# Patient Record
Sex: FEMALE | Race: WHITE | Employment: FULL TIME | URBAN - METROPOLITAN AREA
[De-identification: names, ages, dates, MRNs, and addresses within clinical notes are randomized per-mention and may not be internally consistent; named-entity substitution may affect disease eponyms.]

---

## 2018-01-06 PROBLEM — O26.90 PREGNANCY RELATED CONDITION: Status: ACTIVE | Noted: 2018-01-06

## 2018-03-06 PROBLEM — Z32.02 PREGNANCY EXAMINATION OR TEST, NEGATIVE RESULT: Status: ACTIVE | Noted: 2018-03-06

## 2019-11-06 LAB
ABO, EXTERNAL RESULT: NORMAL
HEP B, EXTERNAL RESULT: NEGATIVE
HIV, EXTERNAL RESULT: NON REACTIVE
RH FACTOR, EXTERNAL RESULT: POSITIVE
RPR, EXTERNAL RESULT: NON REACTIVE
RUBELLA TITER, EXTERNAL RESULT: NORMAL

## 2019-11-26 ENCOUNTER — OFFICE VISIT (OUTPATIENT)
Dept: SURGERY | Age: 29
End: 2019-11-26
Payer: COMMERCIAL

## 2019-11-26 VITALS
DIASTOLIC BLOOD PRESSURE: 68 MMHG | HEART RATE: 101 BPM | RESPIRATION RATE: 20 BRPM | WEIGHT: 162.2 LBS | BODY MASS INDEX: 26.18 KG/M2 | SYSTOLIC BLOOD PRESSURE: 117 MMHG

## 2019-11-26 DIAGNOSIS — R22.32 MASS OF LEFT UPPER EXTREMITY: Primary | ICD-10-CM

## 2019-11-26 PROCEDURE — 99203 OFFICE O/P NEW LOW 30 MIN: CPT | Performed by: NURSE PRACTITIONER

## 2019-11-27 ENCOUNTER — HOSPITAL ENCOUNTER (OUTPATIENT)
Dept: ULTRASOUND IMAGING | Age: 29
Discharge: HOME | End: 2019-11-27
Payer: COMMERCIAL

## 2019-11-27 DIAGNOSIS — R22.32 MASS OF LEFT UPPER EXTREMITY: ICD-10-CM

## 2019-11-27 PROCEDURE — 76882 US LMTD JT/FCL EVL NVASC XTR: CPT

## 2019-11-27 PROCEDURE — 76999 ECHO EXAMINATION PROCEDURE: CPT

## 2019-12-03 ENCOUNTER — OFFICE VISIT (OUTPATIENT)
Dept: SURGERY | Age: 29
End: 2019-12-03
Payer: COMMERCIAL

## 2019-12-03 VITALS — OXYGEN SATURATION: 100 % | SYSTOLIC BLOOD PRESSURE: 98 MMHG | DIASTOLIC BLOOD PRESSURE: 70 MMHG | HEART RATE: 115 BPM

## 2019-12-03 DIAGNOSIS — R22.32 MASS OF LEFT UPPER EXTREMITY: Primary | ICD-10-CM

## 2019-12-03 PROCEDURE — 99212 OFFICE O/P EST SF 10 MIN: CPT | Performed by: NURSE PRACTITIONER

## 2020-05-11 NOTE — PROGRESS NOTES
Subjective:     Chief Complaint:    Chief Complaint   Patient presents with    Follow-up     lump/rash left upper arm     HPI:  Chaitanya Champion  presents for follow up of a abscess located over the left arm. Patient states she had a tDap 2 years ago and developed a knot that has not gone away. She states over the last several months it has grown in size and become increasingly more painful. About 4 months, noticed two red dots. Erythema/rash increased in size. Had [address] which revealed:  Likely enlarged lymph node however a complex cyst or small abscess are possible. Past Medical History:   Diagnosis Date    Asthma     Back pain     compressed discs, L4 and L5, herniated disc    Migraines      Family History   Problem Relation Age of Onset    Other Mother         migraines    Other Father         migraines     Review of Systems:  Pertinent items are noted in HPI. Objective:      /74   Pulse 106   Temp 97.6 °F (36.4 °C) (Tympanic)   Resp 18   Wt 199 lb 12.8 oz (90.6 kg)   LMP 09/29/2019   Breastfeeding No   BMI 32.25 kg/m²     Physical Exam  Constitutional:       Appearance: She is well-developed. Eyes:      General: No scleral icterus. Conjunctiva/sclera: Conjunctivae normal.   Cardiovascular:      Rate and Rhythm: Normal rate and regular rhythm. Heart sounds: Normal heart sounds. No murmur. Pulmonary:      Effort: Pulmonary effort is normal. No respiratory distress. Breath sounds: Normal breath sounds. No wheezing. Musculoskeletal:        Arms:        Assessment & Plan: Mass of left upper extremity with new rash - will plan IR to drain abscess and excisional biopsy in office.     Georgie Shea, APRN-CNP

## 2020-05-12 ENCOUNTER — OFFICE VISIT (OUTPATIENT)
Dept: SURGERY | Age: 30
End: 2020-05-12
Payer: COMMERCIAL

## 2020-05-12 VITALS
HEART RATE: 106 BPM | WEIGHT: 199.8 LBS | DIASTOLIC BLOOD PRESSURE: 74 MMHG | SYSTOLIC BLOOD PRESSURE: 120 MMHG | RESPIRATION RATE: 18 BRPM | TEMPERATURE: 97.6 F | BODY MASS INDEX: 32.25 KG/M2

## 2020-05-12 PROCEDURE — 99213 OFFICE O/P EST LOW 20 MIN: CPT | Performed by: NURSE PRACTITIONER

## 2020-05-14 NOTE — PROGRESS NOTES
5/14/20 - . LM with times, surgery on 5/20/20 @0930, arrival 0730 - get your covid-19 test 4 days before your procedure, then go into quarantine till after your procedure.

## 2020-05-15 ENCOUNTER — HOSPITAL ENCOUNTER (OUTPATIENT)
Age: 30
Setting detail: SPECIMEN
Discharge: HOME | End: 2020-05-15
Payer: COMMERCIAL

## 2020-05-15 PROCEDURE — U0002 COVID-19 LAB TEST NON-CDC: HCPCS

## 2020-05-16 LAB
SARS-COV-2: NOT DETECTED
SOURCE: NORMAL

## 2020-05-20 ENCOUNTER — HOSPITAL ENCOUNTER (OUTPATIENT)
Dept: INTERVENTIONAL RADIOLOGY/VASCULAR | Age: 30
Discharge: HOME | End: 2020-05-20
Payer: COMMERCIAL

## 2020-05-20 VITALS
BODY MASS INDEX: 31.98 KG/M2 | SYSTOLIC BLOOD PRESSURE: 112 MMHG | OXYGEN SATURATION: 98 % | HEIGHT: 66 IN | RESPIRATION RATE: 16 BRPM | DIASTOLIC BLOOD PRESSURE: 65 MMHG | TEMPERATURE: 98.3 F | HEART RATE: 104 BPM | WEIGHT: 199 LBS

## 2020-05-20 LAB
APTT: 26 SECONDS (ref 25.1–37.1)
HCT VFR BLD CALC: 43.1 % (ref 37–47)
HEMOGLOBIN: 12.9 GM/DL (ref 12.5–16)
INR BLD: 0.9 INDEX
MCH RBC QN AUTO: 28.4 PG (ref 27–31)
MCHC RBC AUTO-ENTMCNC: 29.9 % (ref 32–36)
MCV RBC AUTO: 94.9 FL (ref 78–100)
PDW BLD-RTO: 12.4 % (ref 11.7–14.9)
PLATELET # BLD: 152 K/CU MM (ref 140–440)
PMV BLD AUTO: 11.1 FL (ref 7.5–11.1)
PROTHROMBIN TIME: 10.9 SECONDS (ref 11.7–14.5)
RBC # BLD: 4.54 M/CU MM (ref 4.2–5.4)
WBC # BLD: 10.2 K/CU MM (ref 4–10.5)

## 2020-05-20 PROCEDURE — 2580000003 HC RX 258: Performed by: RADIOLOGY

## 2020-05-20 PROCEDURE — 76942 ECHO GUIDE FOR BIOPSY: CPT

## 2020-05-20 PROCEDURE — 85730 THROMBOPLASTIN TIME PARTIAL: CPT

## 2020-05-20 PROCEDURE — 85027 COMPLETE CBC AUTOMATED: CPT

## 2020-05-20 PROCEDURE — 85610 PROTHROMBIN TIME: CPT

## 2020-05-20 RX ORDER — SODIUM CHLORIDE 0.9 % (FLUSH) 0.9 %
10 SYRINGE (ML) INJECTION 2 TIMES DAILY
Status: DISCONTINUED | OUTPATIENT
Start: 2020-05-20 | End: 2020-05-21 | Stop reason: HOSPADM

## 2020-05-20 RX ADMIN — SODIUM CHLORIDE, PRESERVATIVE FREE 10 ML: 5 INJECTION INTRAVENOUS at 08:26

## 2020-05-20 ASSESSMENT — PAIN - FUNCTIONAL ASSESSMENT: PAIN_FUNCTIONAL_ASSESSMENT: 0-10

## 2020-05-20 NOTE — H&P
Problems:    * No resolved hospital problems.  *    US evaluation of LUE lesion      PLAN OF CARE/PLANNED PROCEDURE

## 2020-05-21 ENCOUNTER — ANESTHESIA EVENT (OUTPATIENT)
Dept: INTERVENTIONAL RADIOLOGY/VASCULAR | Age: 30
End: 2020-05-21

## 2020-05-21 ENCOUNTER — ANESTHESIA (OUTPATIENT)
Dept: INTERVENTIONAL RADIOLOGY/VASCULAR | Age: 30
End: 2020-05-21

## 2020-05-21 ENCOUNTER — HOSPITAL ENCOUNTER (OUTPATIENT)
Dept: INTERVENTIONAL RADIOLOGY/VASCULAR | Age: 30
Discharge: HOME | End: 2020-05-21
Payer: COMMERCIAL

## 2020-05-21 VITALS
OXYGEN SATURATION: 97 % | SYSTOLIC BLOOD PRESSURE: 125 MMHG | RESPIRATION RATE: 18 BRPM | TEMPERATURE: 97.5 F | HEART RATE: 98 BPM | DIASTOLIC BLOOD PRESSURE: 61 MMHG | WEIGHT: 199 LBS | HEIGHT: 66 IN | BODY MASS INDEX: 31.98 KG/M2

## 2020-05-21 VITALS — OXYGEN SATURATION: 97 % | DIASTOLIC BLOOD PRESSURE: 62 MMHG | SYSTOLIC BLOOD PRESSURE: 117 MMHG

## 2020-05-21 PROCEDURE — 87186 SC STD MICRODIL/AGAR DIL: CPT

## 2020-05-21 PROCEDURE — 7100000010 HC PHASE II RECOVERY - FIRST 15 MIN

## 2020-05-21 PROCEDURE — 88341 IMHCHEM/IMCYTCHM EA ADD ANTB: CPT

## 2020-05-21 PROCEDURE — 3700000001 HC ADD 15 MINUTES (ANESTHESIA)

## 2020-05-21 PROCEDURE — 87073 CULTURE BACTERIA ANAEROBIC: CPT

## 2020-05-21 PROCEDURE — 7100000011 HC PHASE II RECOVERY - ADDTL 15 MIN

## 2020-05-21 PROCEDURE — 3700000000 HC ANESTHESIA ATTENDED CARE

## 2020-05-21 PROCEDURE — 20206 BIOPSY MUSCLE PERQ NEEDLE: CPT

## 2020-05-21 PROCEDURE — 87071 CULTURE AEROBIC QUANT OTHER: CPT

## 2020-05-21 PROCEDURE — 2580000003 HC RX 258: Performed by: NURSE ANESTHETIST, CERTIFIED REGISTERED

## 2020-05-21 PROCEDURE — 76942 ECHO GUIDE FOR BIOPSY: CPT

## 2020-05-21 PROCEDURE — 88173 CYTOPATH EVAL FNA REPORT: CPT

## 2020-05-21 PROCEDURE — 87205 SMEAR GRAM STAIN: CPT

## 2020-05-21 PROCEDURE — 2709999900 HC NON-CHARGEABLE SUPPLY

## 2020-05-21 PROCEDURE — 88305 TISSUE EXAM BY PATHOLOGIST: CPT

## 2020-05-21 PROCEDURE — 88342 IMHCHEM/IMCYTCHM 1ST ANTB: CPT

## 2020-05-21 PROCEDURE — C1894 INTRO/SHEATH, NON-LASER: HCPCS

## 2020-05-21 PROCEDURE — 88333 PATH CONSLTJ SURG CYTO XM 1: CPT

## 2020-05-21 PROCEDURE — 88172 CYTP DX EVAL FNA 1ST EA SITE: CPT

## 2020-05-21 RX ORDER — SODIUM CHLORIDE 9 MG/ML
INJECTION, SOLUTION INTRAVENOUS CONTINUOUS PRN
Status: DISCONTINUED | OUTPATIENT
Start: 2020-05-21 | End: 2020-05-21 | Stop reason: SDUPTHER

## 2020-05-21 RX ORDER — SODIUM CHLORIDE 0.9 % (FLUSH) 0.9 %
10 SYRINGE (ML) INJECTION 2 TIMES DAILY
Status: DISCONTINUED | OUTPATIENT
Start: 2020-05-21 | End: 2020-05-22 | Stop reason: HOSPADM

## 2020-05-21 RX ADMIN — SODIUM CHLORIDE: 9 INJECTION, SOLUTION INTRAVENOUS at 10:04

## 2020-05-21 ASSESSMENT — PAIN SCALES - GENERAL: PAINLEVEL_OUTOF10: 0

## 2020-05-21 ASSESSMENT — PAIN - FUNCTIONAL ASSESSMENT: PAIN_FUNCTIONAL_ASSESSMENT: 0-10

## 2020-05-21 NOTE — PROGRESS NOTES
Returned to 23. Report received from IR nurse. Alert and oriented. Respirations even and unlabored. Color pink. Vital signs obtained. Monitors reapplied. Beverage provided. Band aid on left upper arm dry and intact.

## 2020-05-21 NOTE — ANESTHESIA PRE PROCEDURE
Department of Anesthesiology  Preprocedure Note       Name:  Pauline Centeno   Age:  31 y.o.  :  1990                                          MRN:  [id number]         Date:  2020      Surgeon: * No surgeons listed *    Procedure: * No procedures listed *    Medications prior to admission:   Prior to Admission medications    Medication Sig Start Date End Date Taking? Authorizing Provider   sodium chloride 0.9 % SOLN 30 mL with albuterol (5 MG/ML) 0.5% NEBU Inhale into the lungs daily as needed   Yes Historical Provider, MD   acetaminophen (TYLENOL) 500 MG tablet Take 500 mg by mouth every 6 hours as needed for Pain   Yes Historical Provider, MD   Prenatal MV-Min-Fe Fum-FA-DHA (PRENATAL 1 PO) Take by mouth    Historical Provider, MD       Current medications:    Current Outpatient Medications   Medication Sig Dispense Refill    sodium chloride 0.9 % SOLN 30 mL with albuterol (5 MG/ML) 0.5% NEBU Inhale into the lungs daily as needed      acetaminophen (TYLENOL) 500 MG tablet Take 500 mg by mouth every 6 hours as needed for Pain      Prenatal MV-Min-Fe Fum-FA-DHA (PRENATAL 1 PO) Take by mouth       Current Facility-Administered Medications   Medication Dose Route Frequency Provider Last Rate Last Dose    sodium chloride flush 0.9 % injection 10 mL  10 mL Intravenous BID Gonzales Mora MD           Allergies:     Allergies   Allergen Reactions    Latex Itching    Avocado Anaphylaxis    Banana Anaphylaxis    Phenergan [Promethazine] Other (See Comments)     Sweating, HTN, couldn't speak, restlessness, agitation    Ceclor [Cefaclor] Rash    Cefzil [Cefprozil] Rash    Penicillins Rash    Sulfa Antibiotics Rash       Problem List:    Patient Active Problem List   Diagnosis Code    Pregnancy related condition O26.90    Labor and delivery indication for care or intervention O75.9    Pregnancy examination or test, negative result Z32.02       Past Medical History:        Diagnosis Date    Arthritis back, elbow & wrists    Asthma     Last flare up: 3/15/2020    Back pain     compressed discs, L4 and L5, herniated disc    Migraines     Last migraine: 5/14/20       Past Surgical History:        Procedure Laterality Date    WISDOM TOOTH EXTRACTION  08/2010       Social History:    Social History     Tobacco Use    Smoking status: Never Smoker    Smokeless tobacco: Never Used   Substance Use Topics    Alcohol use: Yes     Comment: rarely - 1 drink a month                                Counseling given: Not Answered      Vital Signs (Current):   Vitals:    05/21/20 0758   BP: 136/77   Pulse: 83   Resp: 18   Temp: 98.2 °F (36.8 °C)   TempSrc: Temporal   SpO2: 96%   Weight: 199 lb (90.3 kg)   Height: 5' 6\" (1.676 m)                                              BP Readings from Last 3 Encounters:   05/21/20 136/77   05/20/20 112/65   05/12/20 120/74       NPO Status: Time of last liquid consumption: 2230                        Time of last solid consumption: 2230                        Date of last liquid consumption: 05/20/20                        Date of last solid food consumption: 05/20/20    BMI:   Wt Readings from Last 3 Encounters:   05/21/20 199 lb (90.3 kg)   05/20/20 199 lb (90.3 kg)   05/12/20 199 lb 12.8 oz (90.6 kg)     Body mass index is 32.12 kg/m². CBC:   Lab Results   Component Value Date    WBC 10.2 05/20/2020    RBC 4.54 05/20/2020    HGB 12.9 05/20/2020    HCT 43.1 05/20/2020    MCV 94.9 05/20/2020    RDW 12.4 05/20/2020     05/20/2020       CMP: No results found for: NA, K, CL, CO2, BUN, CREATININE, GFRAA, AGRATIO, LABGLOM, GLUCOSE, PROT, CALCIUM, BILITOT, ALKPHOS, AST, ALT    POC Tests: No results for input(s): POCGLU, POCNA, POCK, POCCL, POCBUN, POCHEMO, POCHCT in the last 72 hours.     Coags:   Lab Results   Component Value Date    PROTIME 10.9 05/20/2020    INR 0.90 05/20/2020    APTT 26.0 05/20/2020       HCG (If Applicable): No results found for: PREGTESTUR, PREGSERUM, HCG, HCGQUANT     ABGs: No results found for: PHART, PO2ART, MGU4EZK, ZRD7OWD, BEART, F4SFMVOT     Type & Screen (If Applicable):  No results found for: LABABO, LABRH    Drug/Infectious Status (If Applicable):  No results found for: HIV, HEPCAB    COVID-19 Screening (If Applicable):   Lab Results   Component Value Date    COVID19 NOT DETECTED 05/15/2020         Anesthesia Evaluation  Patient summary reviewed no history of anesthetic complications:   Airway: Mallampati: II  TM distance: >3 FB   Neck ROM: full  Mouth opening: > = 3 FB Dental:          Pulmonary: breath sounds clear to auscultation  (+) asthma:                            Cardiovascular:            Rhythm: regular                      Neuro/Psych:   (+) headaches:,             GI/Hepatic/Renal:             Endo/Other:                      ROS comment: Pregnant  Abdominal:           Vascular:                                        Anesthesia Plan      MAC     ASA 1     ( Pre Anesthesia Assessment complete. Chart reviewed on 5/21/2020  )  Induction: intravenous. Anesthetic plan and risks discussed with patient. Plan discussed with CRNA.     Attending anesthesiologist reviewed and agrees with Bebeto Campos MD   5/21/2020

## 2020-05-21 NOTE — PROGRESS NOTES
EFM and TOCO on. VS and OB history obtained. Abdomen soft and nontender to palpation. Denies any vaginal bleeding or leaking of fluid. Fetal movement palpated.

## 2020-05-21 NOTE — ANESTHESIA POSTPROCEDURE EVALUATION
Department of Anesthesiology  Postprocedure Note    Patient: Rudy Luna  MRN: [id number]  [place]: 1990  Date of evaluation: 5/21/2020  Time:  10:33 AM     Procedure Summary     Date:  05/21/20 Room / Location:  [facility]    Anesthesia Start:  1004 Anesthesia Stop:  1033    Procedure:  IR ABSCESS DRAIN PERC Diagnosis:       Abscess of bursa of left upper arm      Arm mass, left      (LUE Abscess Drain)    Scheduled Providers:  Wilber Mckeon Radiologist Responsible Provider:  CAMPBELL Person CRNA    Anesthesia Type:  MAC ASA Status:  1          Anesthesia Type: MAC    Kaci Phase I:      Kaci Phase II:      Last vitals: Reviewed and per EMR flowsheets.        Anesthesia Post Evaluation    Patient location during evaluation: bedside  Patient participation: complete - patient participated  Level of consciousness: awake and alert  Nausea & Vomiting: no vomiting and no nausea  Complications: no  Cardiovascular status: blood pressure returned to baseline  Respiratory status: acceptable  Hydration status: euvolemic  Comments:  prior to disposition home

## 2020-05-21 NOTE — ANESTHESIA PRE PROCEDURE
Migraines     Last migraine: 5/14/20       Past Surgical History:        Procedure Laterality Date    WISDOM TOOTH EXTRACTION  08/2010       Social History:    Social History     Tobacco Use    Smoking status: Never Smoker    Smokeless tobacco: Never Used   Substance Use Topics    Alcohol use: Yes     Comment: rarely - 1 drink a month                                Counseling given: Not Answered      Vital Signs (Current): There were no vitals filed for this visit. BP Readings from Last 3 Encounters:   05/20/20 112/65   05/12/20 120/74   12/03/19 98/70       NPO Status:                                                                                 BMI:   Wt Readings from Last 3 Encounters:   05/20/20 199 lb (90.3 kg)   05/12/20 199 lb 12.8 oz (90.6 kg)   11/26/19 162 lb 3.2 oz (73.6 kg)     There is no height or weight on file to calculate BMI.    CBC:   Lab Results   Component Value Date    WBC 10.2 05/20/2020    RBC 4.54 05/20/2020    HGB 12.9 05/20/2020    HCT 43.1 05/20/2020    MCV 94.9 05/20/2020    RDW 12.4 05/20/2020     05/20/2020       CMP: No results found for: NA, K, CL, CO2, BUN, CREATININE, GFRAA, AGRATIO, LABGLOM, GLUCOSE, PROT, CALCIUM, BILITOT, ALKPHOS, AST, ALT    POC Tests: No results for input(s): POCGLU, POCNA, POCK, POCCL, POCBUN, POCHEMO, POCHCT in the last 72 hours.     Coags:   Lab Results   Component Value Date    PROTIME 10.9 05/20/2020    INR 0.90 05/20/2020    APTT 26.0 05/20/2020       HCG (If Applicable): No results found for: PREGTESTUR, PREGSERUM, HCG, HCGQUANT     ABGs: No results found for: PHART, PO2ART, DGX5LLC, HSJ3XKB, BEART, A1KIVXOF     Type & Screen (If Applicable):  No results found for: LABABO, LABRH    Drug/Infectious Status (If Applicable):  No results found for: HIV, HEPCAB    COVID-19 Screening (If Applicable):   Lab Results   Component Value Date    COVID19 NOT DETECTED 05/15/2020         Anesthesia

## 2020-05-21 NOTE — H&P
Date:5/21/2020  Name:Nancy Rodriguez   [facility]:[birth year]   #:[id number]    SEX:female   Referring Physician:    Chief Complaint: LUE mass  History of Present Illness:   Patient is a 32 YO F who is 33 weeks pregnant and presents for an US guided biopsy/aspiraton of a LUE mass. Anesthesia will be present for the case should the patient need sedation.     HISTORY AND PHYSICAL  Abscess of bursa of left upper arm [M71.022]    Past Medical History:  Past Medical History:   Diagnosis Date    Arthritis     back, elbow & wrists    Asthma     Last flare up: 3/15/2020    Back pain     compressed discs, L4 and L5, herniated disc    Migraines     Last migraine: 5/14/20       Past Surgical History:  Past Surgical History:   Procedure Laterality Date    WISDOM TOOTH EXTRACTION  08/2010       Social History:  Social History     Socioeconomic History    Marital status:      Spouse name: Not on file    Number of children: Not on file    Years of education: Not on file    Highest education level: Not on file   Occupational History    Not on file   Social Needs    Financial resource strain: Not on file    Food insecurity     Worry: Not on file     Inability: Not on file    Transportation needs     Medical: Not on file     Non-medical: Not on file   Tobacco Use    Smoking status: Never Smoker    Smokeless tobacco: Never Used   Substance and Sexual Activity    Alcohol use: Yes     Comment: rarely - 1 drink a month    Drug use: No    Sexual activity: Yes     Partners: Male   Lifestyle    Physical activity     Days per week: Not on file     Minutes per session: Not on file    Stress: Not on file   Relationships    Social connections     Talks on phone: Not on file     Gets together: Not on file     Attends [employer] service: Not on file     Active member of club or organization: Not on file     Attends meetings of clubs or organizations: Not on file     Relationship status: Not on file    Intimate partner

## 2020-05-25 LAB
CULTURE: ABNORMAL
CULTURE: ABNORMAL
Lab: ABNORMAL
SPECIMEN: ABNORMAL

## 2020-05-29 ENCOUNTER — OFFICE VISIT (OUTPATIENT)
Dept: SURGERY | Age: 30
End: 2020-05-29
Payer: COMMERCIAL

## 2020-05-29 VITALS
HEART RATE: 110 BPM | SYSTOLIC BLOOD PRESSURE: 122 MMHG | DIASTOLIC BLOOD PRESSURE: 75 MMHG | RESPIRATION RATE: 18 BRPM | TEMPERATURE: 97.5 F

## 2020-05-29 PROCEDURE — 99212 OFFICE O/P EST SF 10 MIN: CPT | Performed by: NURSE PRACTITIONER

## 2020-05-29 NOTE — PROGRESS NOTES
Subjective:     Chief Complaint:    Chief Complaint   Patient presents with    Follow-up     discuss Bx path results     HPI:  Rubin Booker  presents for follow up of a abscess located over the left arm. Patient states she had a tDap 2 years ago and developed a knot that has not gone away. She states over the last several months it has grown in size and become increasingly more painful. About 4 months, noticed two red dots. Erythema/rash increased in size. Had [address] which revealed:  Likely enlarged lymph node however a complex cyst or small abscess are possible. Pathology revealed:   Histologic sections demonstrate a small biopsy of a  proliferation of bland spindle cells admixed with a chronic  inflammatory infiltrate with mixed histiocytes and  hemosiderin depositions    Past Medical History:   Diagnosis Date    Arthritis     back, elbow & wrists    Asthma     Last flare up: 3/15/2020    Back pain     compressed discs, L4 and L5, herniated disc    Migraines     Last migraine: 5/14/20     Family History   Problem Relation Age of Onset    Other Mother         migraines    Other Father         migraines     Review of Systems:  Pertinent items are noted in HPI. Objective:      /75   Pulse 110   Temp 97.5 °F (36.4 °C) (Tympanic)   Resp 18   LMP 09/29/2019   Breastfeeding No     Physical Exam  Constitutional:       Appearance: She is well-developed. Eyes:      General: No scleral icterus. Conjunctiva/sclera: Conjunctivae normal.   Cardiovascular:      Rate and Rhythm: Normal rate and regular rhythm. Heart sounds: Normal heart sounds. No murmur. Pulmonary:      Effort: Pulmonary effort is normal. No respiratory distress. Breath sounds: Normal breath sounds. No wheezing. Musculoskeletal:        Arms:        Assessment & Plan: Mass of left upper extremity with new rash - will refer to dermatology for eval/treat.     Casi Lopez, APRN-CNP

## 2020-06-10 LAB — GBS, EXTERNAL RESULT: NEGATIVE

## 2020-06-23 ENCOUNTER — HOSPITAL ENCOUNTER (OUTPATIENT)
Age: 30
Discharge: HOME | End: 2020-06-23
Payer: COMMERCIAL

## 2020-06-23 PROCEDURE — U0002 COVID-19 LAB TEST NON-CDC: HCPCS

## 2020-06-24 LAB
SARS-COV-2: NOT DETECTED
SOURCE: NORMAL

## 2020-07-07 ENCOUNTER — ANESTHESIA (OUTPATIENT)
Dept: LABOR AND DELIVERY | Age: 30
End: 2020-07-07
Payer: COMMERCIAL

## 2020-07-07 ENCOUNTER — HOSPITAL ENCOUNTER (INPATIENT)
Age: 30
LOS: 2 days | Discharge: HOME | End: 2020-07-09
Attending: OBSTETRICS & GYNECOLOGY | Admitting: OBSTETRICS & GYNECOLOGY
Payer: COMMERCIAL

## 2020-07-07 ENCOUNTER — ANESTHESIA EVENT (OUTPATIENT)
Dept: LABOR AND DELIVERY | Age: 30
End: 2020-07-07
Payer: COMMERCIAL

## 2020-07-07 PROBLEM — O26.93 PREGNANCY RELATED CONDITION IN THIRD TRIMESTER: Status: ACTIVE | Noted: 2020-07-07

## 2020-07-07 LAB
ABO/RH: NORMAL
ANTIBODY SCREEN: NEGATIVE
BACTERIA: NEGATIVE /HPF
BILIRUBIN URINE: NEGATIVE MG/DL
BLOOD, URINE: NEGATIVE
CLARITY: CLEAR
COLOR: COLORLESS
GLUCOSE, URINE: NEGATIVE MG/DL
HCT VFR BLD CALC: 36.7 % (ref 37–47)
HEMOGLOBIN: 11.8 GM/DL (ref 12.5–16)
KETONES, URINE: NEGATIVE MG/DL
LEUKOCYTE ESTERASE, URINE: NEGATIVE
MCH RBC QN AUTO: 27.1 PG (ref 27–31)
MCHC RBC AUTO-ENTMCNC: 32.2 % (ref 32–36)
MCV RBC AUTO: 84.2 FL (ref 78–100)
MUCUS: ABNORMAL HPF
NITRITE URINE, QUANTITATIVE: NEGATIVE
PDW BLD-RTO: 13.3 % (ref 11.7–14.9)
PH, URINE: 6 (ref 5–8)
PLATELET # BLD: 161 K/CU MM (ref 140–440)
PMV BLD AUTO: 11.4 FL (ref 7.5–11.1)
PROTEIN UA: NEGATIVE MG/DL
RBC # BLD: 4.36 M/CU MM (ref 4.2–5.4)
RBC URINE: <1 /HPF (ref 0–6)
SPECIFIC GRAVITY UA: 1 (ref 1–1.03)
SQUAMOUS EPITHELIAL: <1 /HPF
TRICHOMONAS: ABNORMAL /HPF
UROBILINOGEN, URINE: NORMAL MG/DL (ref 0.2–1)
WBC # BLD: 10.8 K/CU MM (ref 4–10.5)
WBC UA: <1 /HPF (ref 0–5)

## 2020-07-07 PROCEDURE — 2500000003 HC RX 250 WO HCPCS: Performed by: NURSE ANESTHETIST, CERTIFIED REGISTERED

## 2020-07-07 PROCEDURE — 86850 RBC ANTIBODY SCREEN: CPT

## 2020-07-07 PROCEDURE — 6370000000 HC RX 637 (ALT 250 FOR IP): Performed by: OBSTETRICS & GYNECOLOGY

## 2020-07-07 PROCEDURE — 80307 DRUG TEST PRSMV CHEM ANLYZR: CPT

## 2020-07-07 PROCEDURE — 2580000003 HC RX 258: Performed by: OBSTETRICS & GYNECOLOGY

## 2020-07-07 PROCEDURE — 86901 BLOOD TYPING SEROLOGIC RH(D): CPT

## 2020-07-07 PROCEDURE — 1220000000 HC SEMI PRIVATE OB R&B

## 2020-07-07 PROCEDURE — 81001 URINALYSIS AUTO W/SCOPE: CPT

## 2020-07-07 PROCEDURE — 86900 BLOOD TYPING SEROLOGIC ABO: CPT

## 2020-07-07 PROCEDURE — 85027 COMPLETE CBC AUTOMATED: CPT

## 2020-07-07 RX ORDER — SODIUM CHLORIDE, SODIUM LACTATE, POTASSIUM CHLORIDE, CALCIUM CHLORIDE 600; 310; 30; 20 MG/100ML; MG/100ML; MG/100ML; MG/100ML
INJECTION, SOLUTION INTRAVENOUS CONTINUOUS
Status: DISCONTINUED | OUTPATIENT
Start: 2020-07-07 | End: 2020-07-09 | Stop reason: HOSPADM

## 2020-07-07 RX ORDER — ROPIVACAINE HYDROCHLORIDE 2 MG/ML
12 INJECTION, SOLUTION EPIDURAL; INFILTRATION; PERINEURAL CONTINUOUS
Status: DISCONTINUED | OUTPATIENT
Start: 2020-07-08 | End: 2020-07-09 | Stop reason: HOSPADM

## 2020-07-07 RX ORDER — LIDOCAINE HYDROCHLORIDE 10 MG/ML
30 INJECTION, SOLUTION EPIDURAL; INFILTRATION; INTRACAUDAL; PERINEURAL PRN
Status: DISCONTINUED | OUTPATIENT
Start: 2020-07-07 | End: 2020-07-09 | Stop reason: HOSPADM

## 2020-07-07 RX ORDER — ONDANSETRON 2 MG/ML
4 INJECTION INTRAMUSCULAR; INTRAVENOUS EVERY 6 HOURS PRN
Status: DISCONTINUED | OUTPATIENT
Start: 2020-07-07 | End: 2020-07-08 | Stop reason: HOSPADM

## 2020-07-07 RX ORDER — FENTANYL CITRATE 50 UG/ML
100 INJECTION, SOLUTION INTRAMUSCULAR; INTRAVENOUS
Status: DISCONTINUED | OUTPATIENT
Start: 2020-07-07 | End: 2020-07-08 | Stop reason: HOSPADM

## 2020-07-07 RX ORDER — NALOXONE HYDROCHLORIDE 0.4 MG/ML
0.4 INJECTION, SOLUTION INTRAMUSCULAR; INTRAVENOUS; SUBCUTANEOUS PRN
Status: DISCONTINUED | OUTPATIENT
Start: 2020-07-07 | End: 2020-07-09 | Stop reason: HOSPADM

## 2020-07-07 RX ADMIN — LIDOCAINE HYDROCHLORIDE 3 ML: 10 INJECTION, SOLUTION EPIDURAL; INFILTRATION; INTRACAUDAL; PERINEURAL at 23:58

## 2020-07-07 RX ADMIN — SODIUM CHLORIDE, POTASSIUM CHLORIDE, SODIUM LACTATE AND CALCIUM CHLORIDE: 600; 310; 30; 20 INJECTION, SOLUTION INTRAVENOUS at 21:05

## 2020-07-07 RX ADMIN — Medication 25 MCG: at 21:32

## 2020-07-07 ASSESSMENT — PAIN SCALES - GENERAL
PAINLEVEL_OUTOF10: 0
PAINLEVEL_OUTOF10: 0

## 2020-07-07 ASSESSMENT — PAIN DESCRIPTION - DESCRIPTORS
DESCRIPTORS: CRAMPING;ACHING
DESCRIPTORS: CRAMPING;ACHING

## 2020-07-08 LAB
AMPHETAMINES: NEGATIVE
BARBITURATE SCREEN URINE: NEGATIVE
BENZODIAZEPINE SCREEN, URINE: NEGATIVE
CANNABINOID SCREEN URINE: NEGATIVE
COCAINE METABOLITE: NEGATIVE
OPIATES, URINE: NEGATIVE
OXYCODONE: NEGATIVE
PHENCYCLIDINE, URINE: NEGATIVE

## 2020-07-08 PROCEDURE — 3E0P7VZ INTRODUCTION OF HORMONE INTO FEMALE REPRODUCTIVE, VIA NATURAL OR ARTIFICIAL OPENING: ICD-10-PCS | Performed by: OBSTETRICS & GYNECOLOGY

## 2020-07-08 PROCEDURE — 0KQM0ZZ REPAIR PERINEUM MUSCLE, OPEN APPROACH: ICD-10-PCS | Performed by: OBSTETRICS & GYNECOLOGY

## 2020-07-08 PROCEDURE — 6370000000 HC RX 637 (ALT 250 FOR IP): Performed by: OBSTETRICS & GYNECOLOGY

## 2020-07-08 PROCEDURE — 1220000000 HC SEMI PRIVATE OB R&B

## 2020-07-08 PROCEDURE — 51702 INSERT TEMP BLADDER CATH: CPT

## 2020-07-08 PROCEDURE — 6360000002 HC RX W HCPCS: Performed by: NURSE ANESTHETIST, CERTIFIED REGISTERED

## 2020-07-08 PROCEDURE — 2500000003 HC RX 250 WO HCPCS: Performed by: NURSE ANESTHETIST, CERTIFIED REGISTERED

## 2020-07-08 PROCEDURE — 6360000002 HC RX W HCPCS: Performed by: OBSTETRICS & GYNECOLOGY

## 2020-07-08 PROCEDURE — 7200000001 HC VAGINAL DELIVERY

## 2020-07-08 PROCEDURE — 3700000025 EPIDURAL BLOCK: Performed by: NURSE ANESTHETIST, CERTIFIED REGISTERED

## 2020-07-08 PROCEDURE — 2580000003 HC RX 258: Performed by: OBSTETRICS & GYNECOLOGY

## 2020-07-08 RX ORDER — FERROUS SULFATE 325(65) MG
325 TABLET ORAL 2 TIMES DAILY WITH MEALS
Status: DISCONTINUED | OUTPATIENT
Start: 2020-07-08 | End: 2020-07-09 | Stop reason: HOSPADM

## 2020-07-08 RX ORDER — ACETAMINOPHEN 325 MG/1
650 TABLET ORAL EVERY 4 HOURS PRN
Status: DISCONTINUED | OUTPATIENT
Start: 2020-07-08 | End: 2020-07-08

## 2020-07-08 RX ORDER — HYDROCODONE BITARTRATE AND ACETAMINOPHEN 5; 325 MG/1; MG/1
1 TABLET ORAL EVERY 4 HOURS PRN
Status: DISCONTINUED | OUTPATIENT
Start: 2020-07-08 | End: 2020-07-09 | Stop reason: HOSPADM

## 2020-07-08 RX ORDER — DOCUSATE SODIUM 100 MG/1
CAPSULE, LIQUID FILLED ORAL
Status: DISPENSED
Start: 2020-07-08 | End: 2020-07-08

## 2020-07-08 RX ORDER — LIDOCAINE HYDROCHLORIDE AND EPINEPHRINE 15; 5 MG/ML; UG/ML
INJECTION, SOLUTION EPIDURAL PRN
Status: DISCONTINUED | OUTPATIENT
Start: 2020-07-08 | End: 2020-07-08 | Stop reason: SDUPTHER

## 2020-07-08 RX ORDER — DOCUSATE SODIUM 100 MG/1
100 CAPSULE, LIQUID FILLED ORAL 2 TIMES DAILY
Status: DISCONTINUED | OUTPATIENT
Start: 2020-07-08 | End: 2020-07-09 | Stop reason: HOSPADM

## 2020-07-08 RX ORDER — ROPIVACAINE HYDROCHLORIDE 2 MG/ML
INJECTION, SOLUTION EPIDURAL; INFILTRATION; PERINEURAL PRN
Status: DISCONTINUED | OUTPATIENT
Start: 2020-07-08 | End: 2020-07-08 | Stop reason: SDUPTHER

## 2020-07-08 RX ORDER — ONDANSETRON 2 MG/ML
4 INJECTION INTRAMUSCULAR; INTRAVENOUS EVERY 6 HOURS PRN
Status: DISCONTINUED | OUTPATIENT
Start: 2020-07-08 | End: 2020-07-09 | Stop reason: HOSPADM

## 2020-07-08 RX ORDER — ACETAMINOPHEN 325 MG/1
650 TABLET ORAL EVERY 4 HOURS PRN
Status: DISCONTINUED | OUTPATIENT
Start: 2020-07-08 | End: 2020-07-09 | Stop reason: HOSPADM

## 2020-07-08 RX ORDER — SODIUM CHLORIDE, SODIUM LACTATE, POTASSIUM CHLORIDE, CALCIUM CHLORIDE 600; 310; 30; 20 MG/100ML; MG/100ML; MG/100ML; MG/100ML
INJECTION, SOLUTION INTRAVENOUS CONTINUOUS
Status: DISCONTINUED | OUTPATIENT
Start: 2020-07-08 | End: 2020-07-09 | Stop reason: HOSPADM

## 2020-07-08 RX ORDER — SODIUM CHLORIDE 0.9 % (FLUSH) 0.9 %
10 SYRINGE (ML) INJECTION PRN
Status: DISCONTINUED | OUTPATIENT
Start: 2020-07-08 | End: 2020-07-09 | Stop reason: HOSPADM

## 2020-07-08 RX ORDER — LANOLIN 100 %
OINTMENT (GRAM) TOPICAL PRN
Status: DISCONTINUED | OUTPATIENT
Start: 2020-07-08 | End: 2020-07-09 | Stop reason: HOSPADM

## 2020-07-08 RX ORDER — ONDANSETRON 4 MG/1
8 TABLET, ORALLY DISINTEGRATING ORAL EVERY 8 HOURS PRN
Status: DISCONTINUED | OUTPATIENT
Start: 2020-07-08 | End: 2020-07-09 | Stop reason: HOSPADM

## 2020-07-08 RX ORDER — ROPIVACAINE HYDROCHLORIDE 2 MG/ML
INJECTION, SOLUTION EPIDURAL; INFILTRATION; PERINEURAL CONTINUOUS PRN
Status: DISCONTINUED | OUTPATIENT
Start: 2020-07-08 | End: 2020-07-08 | Stop reason: SDUPTHER

## 2020-07-08 RX ORDER — LIDOCAINE HYDROCHLORIDE 10 MG/ML
INJECTION, SOLUTION EPIDURAL; INFILTRATION; INTRACAUDAL; PERINEURAL PRN
Status: DISCONTINUED | OUTPATIENT
Start: 2020-07-07 | End: 2020-07-08 | Stop reason: SDUPTHER

## 2020-07-08 RX ORDER — SODIUM CHLORIDE 0.9 % (FLUSH) 0.9 %
10 SYRINGE (ML) INJECTION EVERY 12 HOURS SCHEDULED
Status: DISCONTINUED | OUTPATIENT
Start: 2020-07-08 | End: 2020-07-09 | Stop reason: HOSPADM

## 2020-07-08 RX ORDER — HYDROCODONE BITARTRATE AND ACETAMINOPHEN 5; 325 MG/1; MG/1
2 TABLET ORAL EVERY 4 HOURS PRN
Status: DISCONTINUED | OUTPATIENT
Start: 2020-07-08 | End: 2020-07-09 | Stop reason: HOSPADM

## 2020-07-08 RX ORDER — IBUPROFEN 800 MG/1
800 TABLET ORAL EVERY 8 HOURS
Status: DISCONTINUED | OUTPATIENT
Start: 2020-07-08 | End: 2020-07-09 | Stop reason: HOSPADM

## 2020-07-08 RX ADMIN — IBUPROFEN 800 MG: 800 TABLET, FILM COATED ORAL at 13:05

## 2020-07-08 RX ADMIN — ONDANSETRON 4 MG: 2 INJECTION INTRAMUSCULAR; INTRAVENOUS at 02:28

## 2020-07-08 RX ADMIN — SODIUM CHLORIDE, POTASSIUM CHLORIDE, SODIUM LACTATE AND CALCIUM CHLORIDE: 600; 310; 30; 20 INJECTION, SOLUTION INTRAVENOUS at 00:11

## 2020-07-08 RX ADMIN — LIDOCAINE HYDROCHLORIDE,EPINEPHRINE BITARTRATE 5 ML: 15; .005 INJECTION, SOLUTION EPIDURAL; INFILTRATION; INTRACAUDAL; PERINEURAL at 00:09

## 2020-07-08 RX ADMIN — ACETAMINOPHEN 650 MG: 325 TABLET ORAL at 08:19

## 2020-07-08 RX ADMIN — Medication 500 MILLI-UNITS/MIN: at 03:53

## 2020-07-08 RX ADMIN — HYDROCODONE BITARTRATE AND ACETAMINOPHEN 1 TABLET: 5; 325 TABLET ORAL at 14:40

## 2020-07-08 RX ADMIN — MAGNESIUM HYDROXIDE 30 ML: 400 SUSPENSION ORAL at 08:18

## 2020-07-08 RX ADMIN — IBUPROFEN 800 MG: 800 TABLET, FILM COATED ORAL at 21:10

## 2020-07-08 RX ADMIN — ROPIVACAINE HYDROCHLORIDE 5 ML: 2 INJECTION, SOLUTION EPIDURAL; INFILTRATION at 00:16

## 2020-07-08 RX ADMIN — IBUPROFEN 800 MG: 800 TABLET, FILM COATED ORAL at 05:10

## 2020-07-08 RX ADMIN — ROPIVACAINE HYDROCHLORIDE 5 ML: 2 INJECTION, SOLUTION EPIDURAL; INFILTRATION at 00:11

## 2020-07-08 RX ADMIN — Medication 999 MILLI-UNITS/MIN: at 03:09

## 2020-07-08 RX ADMIN — DOCUSATE SODIUM 100 MG: 100 CAPSULE, LIQUID FILLED ORAL at 08:20

## 2020-07-08 RX ADMIN — ROPIVACAINE HYDROCHLORIDE 12 ML/HR: 2 INJECTION, SOLUTION EPIDURAL; INFILTRATION at 00:18

## 2020-07-08 RX ADMIN — DOCUSATE SODIUM 100 MG: 100 CAPSULE, LIQUID FILLED ORAL at 21:10

## 2020-07-08 ASSESSMENT — PAIN DESCRIPTION - FREQUENCY: FREQUENCY: INTERMITTENT

## 2020-07-08 ASSESSMENT — PAIN SCALES - GENERAL
PAINLEVEL_OUTOF10: 3
PAINLEVEL_OUTOF10: 2
PAINLEVEL_OUTOF10: 0
PAINLEVEL_OUTOF10: 4
PAINLEVEL_OUTOF10: 0
PAINLEVEL_OUTOF10: 0
PAINLEVEL_OUTOF10: 6
PAINLEVEL_OUTOF10: 3
PAINLEVEL_OUTOF10: 3
PAINLEVEL_OUTOF10: 0

## 2020-07-08 ASSESSMENT — PAIN DESCRIPTION - PROGRESSION
CLINICAL_PROGRESSION: NOT CHANGED
CLINICAL_PROGRESSION: GRADUALLY WORSENING
CLINICAL_PROGRESSION: GRADUALLY IMPROVING

## 2020-07-08 ASSESSMENT — PAIN DESCRIPTION - ONSET: ONSET: GRADUAL

## 2020-07-08 ASSESSMENT — PAIN DESCRIPTION - DESCRIPTORS
DESCRIPTORS: CRAMPING
DESCRIPTORS: CRAMPING;ACHING
DESCRIPTORS: ACHING;CRAMPING

## 2020-07-08 ASSESSMENT — PAIN DESCRIPTION - LOCATION: LOCATION: ABDOMEN

## 2020-07-08 ASSESSMENT — PAIN DESCRIPTION - ORIENTATION: ORIENTATION: LOWER

## 2020-07-08 ASSESSMENT — PAIN DESCRIPTION - PAIN TYPE: TYPE: ACUTE PAIN

## 2020-07-08 ASSESSMENT — PAIN - FUNCTIONAL ASSESSMENT: PAIN_FUNCTIONAL_ASSESSMENT: ACTIVITIES ARE NOT PREVENTED

## 2020-07-08 NOTE — H&P
Department of Obstetrics and Gynecology   Obstetrics History and Physical        CHIEF COMPLAINT:   Chief Complaint   Patient presents with    Scheduled Induction         HISTORY OF PRESENT ILLNESS:      The patient is a 31 y.o. female at 44w3d. OB History        3    Para   2    Term   2            AB        Living   2       SAB        TAB        Ectopic        Molar        Multiple        Live Births   2            Patient presents with a chief complaint as above and is being admitted for induction    Estimated Due Date: Estimated Date of Delivery: 20    PRENATAL CARE:    Complicated by: none    PAST OB HISTORY  OB History        3    Para   2    Term   2            AB        Living   2       SAB        TAB        Ectopic        Molar        Multiple        Live Births   2                Past Medical History:        Diagnosis Date    Arthritis     back, elbow & wrists    Asthma     Last flare up: 3/15/2020    Back pain     compressed discs, L4 and L5, herniated disc    Migraines     Last migraine: 20     Past Surgical History:        Procedure Laterality Date    WISDOM TOOTH EXTRACTION  2010     Allergies:  Latex; Avocado; Banana; Phenergan [promethazine]; Ceclor [cefaclor]; Cefzil [cefprozil];  Penicillins; and Sulfa antibiotics  Social History:    Social History     Socioeconomic History    Marital status:      Spouse name: Not on file    Number of children: Not on file    Years of education: Not on file    Highest education level: Not on file   Occupational History    Not on file   Social Needs    Financial resource strain: Not on file    Food insecurity     Worry: Not on file     Inability: Not on file    Transportation needs     Medical: Not on file     Non-medical: Not on file   Tobacco Use    Smoking status: Never Smoker    Smokeless tobacco: Never Used   Substance and Sexual Activity    Alcohol use: Yes     Comment: rarely - 1 drink a month  Drug use: No    Sexual activity: Yes     Partners: Male   Lifestyle    Physical activity     Days per week: Not on file     Minutes per session: Not on file    Stress: Not on file   Relationships    Social connections     Talks on phone: Not on file     Gets together: Not on file     Attends Voodoo service: Not on file     Active member of club or organization: Not on file     Attends meetings of clubs or organizations: Not on file     Relationship status: Not on file    Intimate partner violence     Fear of current or ex partner: Not on file     Emotionally abused: Not on file     Physically abused: Not on file     Forced sexual activity: Not on file   Other Topics Concern    Not on file   Social History Narrative    Not on file     Family History:       Problem Relation Age of Onset    Other Mother         migraines    Other Father         migraines     Medications Prior to Admission:  Medications Prior to Admission: ALBUTEROL IN, Inhale into the lungs  Prenatal MV-Min-Fe Fum-FA-DHA (PRENATAL 1 PO), Take by mouth  acetaminophen (TYLENOL) 500 MG tablet, Take 500 mg by mouth every 6 hours as needed for Pain  [DISCONTINUED] sodium chloride 0.9 % SOLN 30 mL with albuterol (5 MG/ML) 0.5% NEBU, Inhale into the lungs daily as needed    REVIEW OF SYSTEMS:    CONSTITUTIONAL:  negative  RESPIRATORY:  negative  CARDIOVASCULAR:  negative  GASTROINTESTINAL:  negative  ALLERGIC/IMMUNOLOGIC:  negative  NEUROLOGICAL:  negative  BEHAVIOR/PSYCH:  negative    PHYSICAL EXAM:  Blood pressure (!) 106/57, pulse 93, temperature 97.2 °F (36.2 °C), temperature source Axillary, resp. rate 16, height 5' 6\" (1.676 m), weight 213 lb (96.6 kg), last menstrual period 09/29/2019, not currently breastfeeding. General appearance:  awake, alert, cooperative, no apparent distress, and appears stated age  Neurologic:  Awake, alert, oriented to name, place and time.     Lungs:  No increased work of breathing, good air exchange  Abdomen: Soft, non tender, gravid, consistent with her gestational age,   Fetal heart rate:    Baseline Heart Rate:  130s        Accelerations:  present       Long Term Variability:  moderate       Decelerations:  absent       Pelvis:  Adequate pelvis  Cervix: 2 cm 75% soft -2      Contraction frequency:  0 minutes    Membranes:  Intact    ASSESSMENT AND PLAN:    Labor: Admit, anticipate normal delivery, routine labor orders  Fetus: Reassuring  GBS:negative  Other: IV hydration and antiemetics, plan cytotec for cervical ripening and labor induction

## 2020-07-08 NOTE — ANESTHESIA PRE PROCEDURE
Department of Anesthesiology  Preprocedure Note       Name:  Marimar De Santiago   Age:  31 y.o.  :  1990                                          MRN:  [id number]         Date:  2020      Surgeon: * No surgeons listed *    Procedure: * No procedures listed *    Medications prior to admission:   Prior to Admission medications    Medication Sig Start Date End Date Taking?  Authorizing Provider   ALBUTEROL IN Inhale into the lungs   Yes Historical Provider, MD   Prenatal MV-Min-Fe Fum-FA-DHA (PRENATAL 1 PO) Take by mouth   Yes Historical Provider, MD   acetaminophen (TYLENOL) 500 MG tablet Take 500 mg by mouth every 6 hours as needed for Pain   Yes Historical Provider, MD       Current medications:    Current Facility-Administered Medications   Medication Dose Route Frequency Provider Last Rate Last Dose    lactated ringers infusion   Intravenous Continuous Symone Zhong  mL/hr at 20 0043      lidocaine PF 1 % injection 30 mL  30 mL Other PRN Symone Zhong MD        fentaNYL (SUBLIMAZE) injection 100 mcg  100 mcg Intravenous Q1H PRN Symone Zhong MD        ondansetron [facility]) injection 4 mg  4 mg Intravenous Q6H PRN Symone Zhong MD        famotidine (PEPCID) injection 20 mg  20 mg Intravenous BID PRN Symone Zhong MD        oxytocin (PITOCIN) 30 units in 500 mL infusion  1 ophelia-units/min Intravenous Continuous PRN Symone Zhong MD        misoprostol (CYTOTEC) pre-split tablet TABS 25 mcg  25 mcg Vaginal Q4H Symone Zhong MD   25 mcg at 20 2132    naloxone [facility]) injection 0.4 mg  0.4 mg Intravenous PRN Christiana Puckett MD        ropivacaine (NAROPIN) 0.2% injection 0.2%  12 mL/hr Epidural Continuous Christiana Puckett MD         Facility-Administered Medications Ordered in Other Encounters   Medication Dose Route Frequency Provider Last Rate Last Dose    lidocaine PF 1 % injection    PRN Dandy Nolan APRN - CRNA   3 mL at 07/07/20 2358    Lidocaine-EPINEPHrine 1.5 %-1:023470    PRN Clayton Aspen, APRN - CRNA   5 mL at 07/08/20 0009    ropivacaine (NAROPIN) 0.2% injection 0.2%    PRN Clayton Aspen, APRN - CRNA   5 mL at 07/08/20 0016    ropivacaine (NAROPIN) 0.2% injection 0.2%    Continuous PRN Clayton Aspen, APRN - CRNA 12 mL/hr at 07/08/20 0018 12 mL/hr at 07/08/20 0018       Allergies:     Allergies   Allergen Reactions    Latex Itching    Avocado Anaphylaxis    Banana Anaphylaxis    Phenergan [Promethazine] Other (See Comments)     Sweating, HTN, couldn't speak, restlessness, agitation    Ceclor [Cefaclor] Rash    Cefzil [Cefprozil] Rash    Penicillins Rash    Sulfa Antibiotics Rash       Problem List:    Patient Active Problem List   Diagnosis Code    Pregnancy related condition O26.90    Labor and delivery indication for care or intervention O75.9    Pregnancy examination or test, negative result Z32.02    Pregnancy related condition in third trimester O26.93       Past Medical History:        Diagnosis Date    Arthritis     back, elbow & wrists    Asthma     Last flare up: 3/15/2020    Back pain     compressed discs, L4 and L5, herniated disc    Migraines     Last migraine: 5/14/20       Past Surgical History:        Procedure Laterality Date    WISDOM TOOTH EXTRACTION  08/2010       Social History:    Social History     Tobacco Use    Smoking status: Never Smoker    Smokeless tobacco: Never Used   Substance Use Topics    Alcohol use: Yes     Comment: rarely - 1 drink a month                                Counseling given: Not Answered      Vital Signs (Current):   Vitals:    07/08/20 0020 07/08/20 0023 07/08/20 0028 07/08/20 0033   BP: (!) 102/57 (!) 99/55 (!) 98/55 (!) 104/58   Pulse: 105 97 98 102   Resp:  20     Temp:  37.3 °C (99.2 °F)     TempSrc:  Axillary     Weight:       Height:                                                  BP Readings from Last 3 Encounters:   07/08/20 (!) 104/58 05/29/20 122/75   05/21/20 125/61       NPO Status: Time of last liquid consumption: 2030                        Time of last solid consumption: 1800                        Date of last liquid consumption: 07/07/20                        Date of last solid food consumption: 07/07/20    BMI:   Wt Readings from Last 3 Encounters:   07/07/20 213 lb (96.6 kg)   05/21/20 199 lb (90.3 kg)   05/20/20 199 lb (90.3 kg)     Body mass index is 34.38 kg/m². CBC:   Lab Results   Component Value Date    WBC 10.8 07/07/2020    RBC 4.36 07/07/2020    HGB 11.8 07/07/2020    HCT 36.7 07/07/2020    MCV 84.2 07/07/2020    RDW 13.3 07/07/2020     07/07/2020       CMP: No results found for: NA, K, CL, CO2, BUN, CREATININE, GFRAA, AGRATIO, LABGLOM, GLUCOSE, PROT, CALCIUM, BILITOT, ALKPHOS, AST, ALT    POC Tests: No results for input(s): POCGLU, POCNA, POCK, POCCL, POCBUN, POCHEMO, POCHCT in the last 72 hours.     Coags:   Lab Results   Component Value Date    PROTIME 10.9 05/20/2020    INR 0.90 05/20/2020    APTT 26.0 05/20/2020       HCG (If Applicable): No results found for: PREGTESTUR, PREGSERUM, HCG, HCGQUANT     ABGs: No results found for: PHART, PO2ART, GWM9IHB, ARJ6LYN, BEART, H2ORQPCX     Type & Screen (If Applicable):  No results found for: LABABO, LABRH    Drug/Infectious Status (If Applicable):  No results found for: HIV, HEPCAB    COVID-19 Screening (If Applicable):   Lab Results   Component Value Date    COVID19 NOT DETECTED 06/23/2020         Anesthesia Evaluation  Patient summary reviewed and Nursing notes reviewed  Airway: Mallampati: II  TM distance: >3 FB   Neck ROM: full  Mouth opening: > = 3 FB Dental: normal exam         Pulmonary:   (+) asthma:                            Cardiovascular:  Exercise tolerance: good (>4 METS),                     Neuro/Psych:   (+) headaches:,             GI/Hepatic/Renal: Neg GI/Hepatic/Renal ROS            Endo/Other:                      ROS comment: History of back pain, states has cruched disc at l4/5. Abdominal:           Vascular:                                        Anesthesia Plan      epidural     ASA 3     (Infornmed of higher risk of back pain flare up due to history of l4/5 troubles, wishes to proceed)        Anesthetic plan and risks discussed with patient and spouse.                       Dustin Patel, CAMPBELL - CRNA   7/8/2020

## 2020-07-08 NOTE — L&D DELIVERY NOTE
Mother's Information    Labor Events     labor?:  No  Rupture type:  Spontaneous=SROM, Intact  Fluid color:  Yellow  Fluid odor:  None     Mother Delivery Information    Episiotomy:  None  Lacerations:  None  # of Repair Packets:  1  Vaginal Delivery Est. Blood Loss (mL):  300  Surgical or Additional Est. Blood Loss (mL):  0 (View Only):  Edit in Flowsheets   Combined Est. Blood Loss (mL):  300        [place], Baby Pending Ginger Ivy [[id number]]    Labor Events     labor?:  No   steroids?:  None  Cervical ripening date/time: 20 21:32:00   Cervical ripening type:  Misoprostol  Antibiotics received during labor?:  No  Rupture Identifier:  Sac 1   Rupture date/time:     Rupture type:  Spontaneous=SROM  Fluid color:  Yellow  Fluid odor:  None  Induction:  Misoprostol  Indications for induction:  Elective  Augmentation:  None  Labor complications:  None          Labor Event Times    Labor onset date/time: 20 0020 EDT   Dilation complete date/time:   20 0243   Start pushin2020 0254   Decision time (emergent ):        Anesthesia    Method:  Epidural     Assisted Delivery Details    Forceps attempted?:  No  Vacuum extractor attempted?:  No     Document Additional Attempt       Document Additional Attempt             Shoulder Dystocia    Shoulder dystocia present?:  No  Add Second Maneuver  Add Third Maneuver  Add Fourth Maneuver  Add Fifth Maneuver  Add Sixth Maneuver  Add Seventh Maneuver  Add Eighth Maneuver  Add Ninth Maneuver      Presentation    Presentation:  Vertex  Position:  Left  _:  Occiput  _:  Anterior     [place] Information    Head delivery date/time:  2020 03:06:00   Changing the 's delivery date/time could affect patient care.:     Delivery date/time:   20 0306   Delivery type:  Vaginal, Spontaneous    Details:         Delivery Providers    Delivering clinician:  Nicholas Pool MD   Provider Role    Zheng Robert RN infant delivered atraumatically, placed on mother abdomen. Cord was clamped and cut and infant handed off to the waiting nurse for evaluation. The delivery of the placenta was spontaneous. The perineum and vagina were explored and a second degree right suclus laceration was repaired in standard fashion. Infant's name is Amaury Hardy.

## 2020-07-08 NOTE — ANESTHESIA PROCEDURE NOTES
Epidural Block    Patient location during procedure: OB  Start time: 7/7/2020 11:58 PM  End time: 7/8/2020 12:20 AM  Reason for block: labor epidural  Staffing  Anesthesiologist: Omar Montana MD  Resident/CRNA: CAMPBELL Montoya - VIKRAM  Performed: resident/CRNA   Preanesthetic Checklist  Completed: patient identified, site marked, pre-op evaluation, timeout performed, IV checked, risks and benefits discussed, monitors and equipment checked, anesthesia consent given, oxygen available and patient being monitored  Epidural  Patient position: sitting  Prep: ChloraPrep  Patient monitoring: continuous pulse ox and frequent blood pressure checks  Approach: midline  Location: lumbar (1-5)  Injection technique: GHADA saline  Provider prep: mask and sterile gloves  Needle  Needle type: Tuohy   Needle gauge: 17 G  Needle length: 3.5 in  Needle insertion depth: 7 cm  Catheter type: side hole  Catheter size: 19 G  Catheter at skin depth: 15 cm  Test dose: negative  Assessment  Sensory level: T6  Hemodynamics: stable  Attempts: 1

## 2020-07-08 NOTE — PLAN OF CARE
Problem: Fluid Volume - Imbalance:  Goal: Absence of imbalanced fluid volume signs and symptoms  Description: Absence of imbalanced fluid volume signs and symptoms  Outcome: Ongoing  Goal: Absence of postpartum hemorrhage signs and symptoms  Description: Absence of postpartum hemorrhage signs and symptoms  Outcome: Ongoing     Problem: Pain - Acute:  Goal: Pain level will decrease  Description: Pain level will decrease  Outcome: Ongoing     Problem: Discharge Planning:  Goal: Discharged to appropriate level of care  Description: Discharged to appropriate level of care  Outcome: Ongoing     Problem: Constipation:  Goal: Bowel elimination is within specified parameters  Description: Bowel elimination is within specified parameters  Outcome: Ongoing     Problem: Infection - Risk of, Puerperal Infection:  Goal: Will show no infection signs and symptoms  Description: Will show no infection signs and symptoms  Outcome: Ongoing

## 2020-07-08 NOTE — FLOWSHEET NOTE
Pt presented to unit for scheduled induction. Pt was taken to LD-2 where CCUA was requested. Pt was orientated to room and POC. Understanding was voiced. FOB at bedside.

## 2020-07-08 NOTE — PROGRESS NOTES
Subjective:     Postpartum Day 1:   The patient feels well. The patient denies emotional concerns. Pain is moderately controlled with current medications. The baby iswell. The patient is ambulating well. The patient is tolerating a normal diet. Complaints of rectal pressure. Minimal bleeding. Objective:        Vitals:    07/08/20 0821   BP: 127/70   Pulse:    Resp: 17   Temp: 97.8 °F (36.6 °C)   SpO2:        Lab Results   Component Value Date    WBC 10.8 (H) 07/07/2020    HGB 11.8 (L) 07/07/2020    HCT 36.7 (L) 07/07/2020    MCV 84.2 07/07/2020     07/07/2020       General:    alert, appears stated age and cooperative       Lochia:  appropriate   Uterine    firm       DVT Evaluation:  No evidence of DVT seen on physical exam.   Gentle manual pelvic exam reveals no hematoma, minimal bleeding  Assessment:     Postpartum day 1 Doing well post delivery. Plan:     Continue current care.     Dee [facility] 7/8/2020 10:19 AM

## 2020-07-09 VITALS
HEIGHT: 66 IN | BODY MASS INDEX: 34.23 KG/M2 | SYSTOLIC BLOOD PRESSURE: 110 MMHG | HEART RATE: 101 BPM | OXYGEN SATURATION: 96 % | TEMPERATURE: 97.6 F | RESPIRATION RATE: 18 BRPM | DIASTOLIC BLOOD PRESSURE: 64 MMHG | WEIGHT: 213 LBS

## 2020-07-09 PROBLEM — Z32.02 PREGNANCY EXAMINATION OR TEST, NEGATIVE RESULT: Status: RESOLVED | Noted: 2018-03-06 | Resolved: 2020-07-09

## 2020-07-09 PROBLEM — O26.93 PREGNANCY RELATED CONDITION IN THIRD TRIMESTER: Status: RESOLVED | Noted: 2020-07-07 | Resolved: 2020-07-09

## 2020-07-09 PROBLEM — O26.90 PREGNANCY RELATED CONDITION: Status: RESOLVED | Noted: 2018-01-06 | Resolved: 2020-07-09

## 2020-07-09 PROCEDURE — 6370000000 HC RX 637 (ALT 250 FOR IP): Performed by: OBSTETRICS & GYNECOLOGY

## 2020-07-09 RX ORDER — IBUPROFEN 800 MG/1
800 TABLET ORAL EVERY 8 HOURS PRN
Qty: 90 TABLET | Refills: 3 | Status: ON HOLD | OUTPATIENT
Start: 2020-07-09 | End: 2022-06-08 | Stop reason: HOSPADM

## 2020-07-09 RX ORDER — HYDROCODONE BITARTRATE AND ACETAMINOPHEN 5; 325 MG/1; MG/1
1 TABLET ORAL EVERY 6 HOURS PRN
Qty: 10 TABLET | Refills: 0 | Status: SHIPPED | OUTPATIENT
Start: 2020-07-09 | End: 2020-07-12

## 2020-07-09 RX ADMIN — IBUPROFEN 800 MG: 800 TABLET, FILM COATED ORAL at 14:22

## 2020-07-09 RX ADMIN — IBUPROFEN 800 MG: 800 TABLET, FILM COATED ORAL at 05:48

## 2020-07-09 RX ADMIN — DOCUSATE SODIUM 100 MG: 100 CAPSULE, LIQUID FILLED ORAL at 08:58

## 2020-07-09 RX ADMIN — ACETAMINOPHEN 650 MG: 325 TABLET ORAL at 05:49

## 2020-07-09 ASSESSMENT — PAIN DESCRIPTION - FREQUENCY
FREQUENCY: INTERMITTENT
FREQUENCY: INTERMITTENT

## 2020-07-09 ASSESSMENT — PAIN DESCRIPTION - LOCATION
LOCATION: ABDOMEN
LOCATION: ABDOMEN

## 2020-07-09 ASSESSMENT — PAIN SCALES - GENERAL
PAINLEVEL_OUTOF10: 4
PAINLEVEL_OUTOF10: 4
PAINLEVEL_OUTOF10: 3
PAINLEVEL_OUTOF10: 2

## 2020-07-09 ASSESSMENT — PAIN DESCRIPTION - PAIN TYPE
TYPE: ACUTE PAIN
TYPE: ACUTE PAIN

## 2020-07-09 ASSESSMENT — PAIN - FUNCTIONAL ASSESSMENT
PAIN_FUNCTIONAL_ASSESSMENT: ACTIVITIES ARE NOT PREVENTED
PAIN_FUNCTIONAL_ASSESSMENT: ACTIVITIES ARE NOT PREVENTED

## 2020-07-09 ASSESSMENT — PAIN DESCRIPTION - ONSET
ONSET: GRADUAL
ONSET: GRADUAL

## 2020-07-09 ASSESSMENT — PAIN DESCRIPTION - DESCRIPTORS
DESCRIPTORS: CRAMPING
DESCRIPTORS: CRAMPING

## 2020-07-09 ASSESSMENT — PAIN DESCRIPTION - ORIENTATION
ORIENTATION: LOWER
ORIENTATION: LOWER

## 2020-07-09 ASSESSMENT — PAIN DESCRIPTION - PROGRESSION
CLINICAL_PROGRESSION: GRADUALLY WORSENING
CLINICAL_PROGRESSION: GRADUALLY IMPROVING

## 2020-07-09 NOTE — ANESTHESIA POSTPROCEDURE EVALUATION
Department of Anesthesiology  Postprocedure Note    Patient: Lucille Vivas  MRN: [id number]  [place]: 1990  Date of evaluation: 7/8/2020  Time:  9:10 PM     Procedure Summary     Date:  07/07/20 Room / Location:      Anesthesia Start:  2358 Anesthesia Stop:  07/08/20 0306    Procedure:  Labor Analgesia Diagnosis:      Scheduled Providers:   Responsible Provider:  CAMPBELL Cornell CRNA    Anesthesia Type:  epidural ASA Status:  3          Anesthesia Type: No value filed. Kaci Phase I: Kaci Score: 9    Kaci Phase II:      Last vitals: Reviewed and per EMR flowsheets.        Anesthesia Post Evaluation    Patient location during evaluation: floor  Patient participation: complete - patient participated  Level of consciousness: awake and alert  Pain score: 1  Airway patency: patent  Nausea & Vomiting: no nausea  Complications: no  Cardiovascular status: hemodynamically stable  Respiratory status: acceptable  Hydration status: euvolemic

## 2020-07-09 NOTE — PLAN OF CARE
Problem: Fluid Volume - Imbalance:  Goal: Absence of imbalanced fluid volume signs and symptoms  Description: Absence of imbalanced fluid volume signs and symptoms  Outcome: Ongoing  Goal: Absence of postpartum hemorrhage signs and symptoms  Description: Absence of postpartum hemorrhage signs and symptoms  Outcome: Ongoing     Problem: Pain - Acute:  Goal: Pain level will decrease  Description: Pain level will decrease  Outcome: Ongoing     Problem: Discharge Planning:  Goal: Discharged to appropriate level of care  Description: Discharged to appropriate level of care  Outcome: Ongoing     Problem: Constipation:  Goal: Bowel elimination is within specified parameters  Description: Bowel elimination is within specified parameters  Outcome: Ongoing     Problem: Infection - Risk of, Puerperal Infection:  Goal: Will show no infection signs and symptoms  Description: Will show no infection signs and symptoms  Outcome: Ongoing     Problem: Mood - Altered:  Goal: Mood stable  Description: Mood stable  Outcome: Ongoing

## 2020-07-09 NOTE — PROGRESS NOTES
Subjective:     Postpartum Day 1:   The patient feels well. The patient denies emotional concerns. Pain is well controlled with current medications. The baby iswell. The patient is ambulating well. The patient is tolerating a normal diet. Objective:        Vitals:    07/09/20 0600   BP: 103/65   Pulse: 85   Resp: 18   Temp: 98.3 °F (36.8 °C)   SpO2:        Lab Results   Component Value Date    WBC 10.8 (H) 07/07/2020    HGB 11.8 (L) 07/07/2020    HCT 36.7 (L) 07/07/2020    MCV 84.2 07/07/2020     07/07/2020       General:    alert, appears stated age and cooperative       Lochia:  appropriate   Uterine    firm       DVT Evaluation:  No evidence of DVT seen on physical exam.     Assessment:     Postpartum day 1 Doing well post delivery. Plan:     Discharge home with standard precautions and return to clinic in 4-6 weeks.     Jorge Masters 7/9/2020 1:14 PM

## 2020-07-09 NOTE — DISCHARGE SUMMARY
Obstetrical Discharge Form    Gestational Age:  44w3d    Antepartum complications: none    Date of Delivery:   2020      Type of Delivery:   vaginal, spontaneous    Delivered By:  Beatriz Steel:       Information for the patient's :  Kenneth Cummings [[id number]]        Anesthesia:    Epidural    Intrapartum complications: None    Feeding method:   breast    Postpartum complications: none    Discharge Date:  2020     Condition of discharge:  excellent    Plan:   Follow up    in 6 week(s)

## 2021-11-18 LAB
ABO, EXTERNAL RESULT: NORMAL
ABO, EXTERNAL RESULT: NORMAL
C. TRACHOMATIS, EXTERNAL RESULT: NEGATIVE
HEP B, EXTERNAL RESULT: NEGATIVE
HIV, EXTERNAL RESULT: NON REACTIVE
N. GONORRHOEAE, EXTERNAL RESULT: NEGATIVE
RH FACTOR, EXTERNAL RESULT: POSITIVE
RPR, EXTERNAL RESULT: NORMAL
RUBELLA TITER, EXTERNAL RESULT: NORMAL

## 2022-01-10 ENCOUNTER — HOSPITAL ENCOUNTER (EMERGENCY)
Age: 32
Discharge: LEFT BEFORE DISCHARGE | End: 2022-01-10
Payer: COMMERCIAL

## 2022-01-10 VITALS
HEART RATE: 90 BPM | RESPIRATION RATE: 14 BRPM | OXYGEN SATURATION: 100 % | SYSTOLIC BLOOD PRESSURE: 102 MMHG | DIASTOLIC BLOOD PRESSURE: 74 MMHG | TEMPERATURE: 98.6 F

## 2022-01-10 LAB
BACTERIA: NEGATIVE /HPF
BILIRUBIN URINE: NEGATIVE MG/DL
BLOOD, URINE: NEGATIVE
CLARITY: CLEAR
COLOR: ABNORMAL
GLUCOSE, URINE: NEGATIVE MG/DL
KETONES, URINE: NEGATIVE MG/DL
LEUKOCYTE ESTERASE, URINE: NEGATIVE
MUCUS: ABNORMAL HPF
NITRITE URINE, QUANTITATIVE: NEGATIVE
PH, URINE: 6 (ref 5–8)
PROTEIN UA: NEGATIVE MG/DL
RBC URINE: ABNORMAL /HPF (ref 0–6)
SPECIFIC GRAVITY UA: 1.01 (ref 1–1.03)
SQUAMOUS EPITHELIAL: <1 /HPF
UROBILINOGEN, URINE: NORMAL MG/DL (ref 0.2–1)
WBC UA: <1 /HPF (ref 0–5)

## 2022-01-10 PROCEDURE — 85025 COMPLETE CBC W/AUTO DIFF WBC: CPT

## 2022-01-10 PROCEDURE — 81001 URINALYSIS AUTO W/SCOPE: CPT

## 2022-01-10 PROCEDURE — 4500000002 HC ER NO CHARGE

## 2022-01-10 RX ORDER — LANOLIN ALCOHOL/MO/W.PET/CERES
50 CREAM (GRAM) TOPICAL DAILY
Status: DISCONTINUED | OUTPATIENT
Start: 2022-01-10 | End: 2022-01-10 | Stop reason: HOSPADM

## 2022-01-10 RX ORDER — 0.9 % SODIUM CHLORIDE 0.9 %
1000 INTRAVENOUS SOLUTION INTRAVENOUS ONCE
Status: DISCONTINUED | OUTPATIENT
Start: 2022-01-10 | End: 2022-01-10 | Stop reason: HOSPADM

## 2022-01-10 ASSESSMENT — PAIN SCALES - GENERAL: PAINLEVEL_OUTOF10: 6

## 2022-01-10 ASSESSMENT — PAIN DESCRIPTION - PAIN TYPE: TYPE: ACUTE PAIN

## 2022-01-10 ASSESSMENT — PAIN DESCRIPTION - DESCRIPTORS: DESCRIPTORS: ACHING

## 2022-01-10 ASSESSMENT — PAIN DESCRIPTION - LOCATION: LOCATION: THROAT

## 2022-01-10 NOTE — ED PROVIDER NOTES
As provider-in-triage, I performed a medical screening history and physical exam on this patient. HISTORY OF PRESENT ILLNESS  Kelsea Carrillo is a 36 y.o. female who is G4, P3 approximately 14 weeks pregnant with nausea vomiting, retching, hematemesis. States that she has had decreased oral intake, vomiting and retching over the last week. Has had difficulty tolerating any food or liquid. Has been seen by OB/GYN, initiated on Reglan and has been on Diclegis. Has taken today but still feeling nauseated. Denies abdominal pain. No vaginal symptoms, no discharge. No urinary symptoms. No other fevers or chills, recent exposures. Maria Fernanda Weber PHYSICAL EXAM  /74   Pulse 90   Temp 98.6 °F (37 °C) (Oral)   Resp 14   LMP 09/29/2021   SpO2 100%     On exam, the patient appears in no acute distress. Speech is clear. Breathing is unlabored. Moves all extremities    Comment: Please note this report has been produced using speech recognition software and may contain errors related to that system including errors in grammar, punctuation, and spelling, as well as words and phrases that may be inappropriate. If there are any questions or concerns please feel free to contact the dictating provider for clarification.         Abdulaziz Nazario 411, [place]  01/10/22 [id number]

## 2022-06-08 ENCOUNTER — HOSPITAL ENCOUNTER (OUTPATIENT)
Age: 32
Discharge: HOME | End: 2022-06-08
Attending: OBSTETRICS & GYNECOLOGY | Admitting: OBSTETRICS & GYNECOLOGY
Payer: COMMERCIAL

## 2022-06-08 VITALS
BODY MASS INDEX: 30.86 KG/M2 | HEIGHT: 66 IN | HEART RATE: 87 BPM | DIASTOLIC BLOOD PRESSURE: 55 MMHG | WEIGHT: 192 LBS | SYSTOLIC BLOOD PRESSURE: 108 MMHG | RESPIRATION RATE: 16 BRPM | TEMPERATURE: 98.6 F

## 2022-06-08 PROBLEM — O47.9 UTERINE CONTRACTIONS: Status: ACTIVE | Noted: 2022-06-08

## 2022-06-08 LAB
AMPHETAMINES: NEGATIVE
BACTERIA: ABNORMAL /HPF
BARBITURATE SCREEN URINE: NEGATIVE
BENZODIAZEPINE SCREEN, URINE: NEGATIVE
BILIRUBIN URINE: NEGATIVE MG/DL
BLOOD, URINE: NEGATIVE
CANNABINOID SCREEN URINE: NEGATIVE
CLARITY: CLEAR
COCAINE METABOLITE: NEGATIVE
COLOR: YELLOW
FETAL FIBRONECTIN: NEGATIVE
GLUCOSE, URINE: NEGATIVE MG/DL
KETONES, URINE: NEGATIVE MG/DL
LEUKOCYTE ESTERASE, URINE: ABNORMAL
NITRITE URINE, QUANTITATIVE: NEGATIVE
OPIATES, URINE: NEGATIVE
OXYCODONE: NEGATIVE
PH, URINE: 7 (ref 5–8)
PHENCYCLIDINE, URINE: NEGATIVE
PROTEIN UA: NEGATIVE MG/DL
RBC URINE: ABNORMAL /HPF (ref 0–6)
SPECIFIC GRAVITY UA: 1 (ref 1–1.03)
SQUAMOUS EPITHELIAL: 2 /HPF
TRANSITIONAL EPITHELIAL: <1 /HPF
TRICHOMONAS: ABNORMAL /HPF
UROBILINOGEN, URINE: 0.2 MG/DL (ref 0.2–1)
WBC UA: 1 /HPF (ref 0–5)

## 2022-06-08 PROCEDURE — 80307 DRUG TEST PRSMV CHEM ANLYZR: CPT

## 2022-06-08 PROCEDURE — 59025 FETAL NON-STRESS TEST: CPT

## 2022-06-08 PROCEDURE — 82731 ASSAY OF FETAL FIBRONECTIN: CPT

## 2022-06-08 PROCEDURE — 99213 OFFICE O/P EST LOW 20 MIN: CPT

## 2022-06-08 PROCEDURE — 99214 OFFICE O/P EST MOD 30 MIN: CPT

## 2022-06-08 PROCEDURE — 81001 URINALYSIS AUTO W/SCOPE: CPT

## 2022-06-08 RX ORDER — ACETAMINOPHEN 325 MG/1
650 TABLET ORAL EVERY 4 HOURS PRN
Status: DISCONTINUED | OUTPATIENT
Start: 2022-06-08 | End: 2022-06-08 | Stop reason: HOSPADM

## 2022-06-08 NOTE — FLOWSHEET NOTE
Discharge instructions, including OB precautions and follow up care. Pt voiced understanding and denies any questions or concerns.

## 2022-06-08 NOTE — FLOWSHEET NOTE
Xena Daniels is an alert and oriented 29 y.o,  female that presents ambulatory from [address] [place] [address] with complaints of \"tightness, pressure and pushing\" feeling in her abdomen and back. Pt was checked in the office and told that she was \"funneling\" with some bloody show and an FFN collected in the office. Pt denies of any recent sexual intercourse. Reports feeling the baby move well. Abdomen soft and non tender to palpation. EFM & toco applied, +FHR. Denies vaginal bleeding or leaking of fluid. Denies urinary complaints. Denies any known complications with this pregnancy. Reports previous births were all vaginal. OB history reviewed. Pt oriented to room and call light. Tentative POC discussed.

## 2022-06-08 NOTE — PROGRESS NOTES
Department of Obstetrics and Gynecology  Labor and Delivery  TRIAGE NOTE      SUBJECTIVE:    Chief Complaint   Patient presents with    Contractions     back and abdomen tightness, pressure and pushing feeling     Pt sent from office for Contractions and pressure. Pt was check in office and was found to be closed. Pt denies any lof, bleeding. Pt states she does feel some irregular contractions about 6-10 mins apart. Pt states +FM. Ffn collected in office. OBJECTIVE    Vitals:  /64   Pulse 89   Temp 98.8 °F (37.1 °C) (Oral)   Resp 17   Ht 5' 6\" (1.676 m)   Wt 192 lb (87.1 kg)   LMP 2021   BMI 30.99 kg/m²       CONSTITUTIONAL:  negative  RESPIRATORY:  negative  CARDIOVASCULAR:  negative  GASTROINTESTINAL:  negative  ALLERGIC/IMMUNOLOGIC:  negative  NEUROLOGICAL:  negative  BEHAVIOR/PSYCH:  negative    Cervix:             Dilation:     Closed         Effacement:    Long         Station:     -3 cm         Consistency:    Firm           Position:     posterior       Membranes:    Intact     Fetal heart rate:        Baseline FHR :    145 bpm              Fetal Accelerations:  present        Fetal Decelerations:  absent        Fetal Variability:   moderate    Contraction frequency:  6-8 minutes    DATA:  Lab Results   Component Value Date    WBC 10.8 (H) 2020    HGB 11.8 (L) 2020    HCT 36.7 (L) 2020    MCV 84.2 2020     2020       Blood Type A+  Rubella- Immune  HIV- non- reactive  Hep B- Negative    ASSESSMENT:     29y.o.  year old  at 34w5d  with 7/15/2022, by Patient Reported  Seen in office this am for routine OB appointment. Was having irregular contractions. SVE in office was \"fingertip Funneled to closed\" Ffn Collected in Office. FHT- reactive  Term deliveries in past no history of PTL    PLAN:  FFn- Sent and Resulted- Negative   SVE- unchanged.    PTL precautions discussed including when to return to Hospital   Will plan for discharge   Follow up in office on 6/23 for routine OB appointment     I have collaborated and updated Dr Carmen Bailey  and the MD agrees with the current POC.        CAMPBELL Posey CNM

## 2022-06-08 NOTE — FLOWSHEET NOTE
Reviewed patient status, ob history, reason for arrival, and pending FFN with Dr. Aman Ramos and [facility]. Hold on recking cervix at this time.

## 2022-06-23 LAB — GBS, EXTERNAL RESULT: NEGATIVE

## 2022-07-04 ENCOUNTER — HOSPITAL ENCOUNTER (OUTPATIENT)
Age: 32
Discharge: HOME | End: 2022-07-04
Attending: OBSTETRICS & GYNECOLOGY | Admitting: OBSTETRICS & GYNECOLOGY
Payer: COMMERCIAL

## 2022-07-04 VITALS
TEMPERATURE: 98.5 F | DIASTOLIC BLOOD PRESSURE: 72 MMHG | RESPIRATION RATE: 18 BRPM | SYSTOLIC BLOOD PRESSURE: 118 MMHG | OXYGEN SATURATION: 97 % | HEART RATE: 95 BPM

## 2022-07-04 PROBLEM — Z36.89 ENCOUNTER FOR TRIAGE IN PREGNANT PATIENT: Status: ACTIVE | Noted: 2022-07-04

## 2022-07-04 PROCEDURE — 59025 FETAL NON-STRESS TEST: CPT

## 2022-07-04 NOTE — FLOWSHEET NOTE
Telephone report to Dr. Ayleen Barth reporting patient vital signs and nst. Discharge orders received.

## 2022-07-04 NOTE — FLOWSHEET NOTE
Patient left the birthing center ambulatory after receiving discharge instructions. Patient voiced understanding without any further questions.

## 2022-07-04 NOTE — PROGRESS NOTES
Patient arrived ambulatory to Labor & Delivery for scheduled NST for polyhydramnios. Patient shown to room. Patient oriented to room and call light. EFM & toco applied, +FHR. Abdomen soft and non tender to palpation. Patient denies headache, epigastric pain, nausea or any timable contractions. Patient reports feeling her baby move well. Denies vaginal bleeding or leaking of fluid.

## 2022-07-08 ENCOUNTER — HOSPITAL ENCOUNTER (INPATIENT)
Age: 32
LOS: 3 days | Discharge: HOME | End: 2022-07-11
Attending: OBSTETRICS & GYNECOLOGY | Admitting: OBSTETRICS & GYNECOLOGY
Payer: COMMERCIAL

## 2022-07-08 PROBLEM — Z34.83 NORMAL PREGNANCY IN MULTIGRAVIDA IN THIRD TRIMESTER: Status: ACTIVE | Noted: 2022-07-08

## 2022-07-08 LAB
ABO/RH: NORMAL
AMPHETAMINES: NEGATIVE
ANTIBODY SCREEN: NEGATIVE
BARBITURATE SCREEN URINE: NEGATIVE
BENZODIAZEPINE SCREEN, URINE: NEGATIVE
CANNABINOID SCREEN URINE: NEGATIVE
COCAINE METABOLITE: NEGATIVE
HCT VFR BLD CALC: 34.9 % (ref 37–47)
HEMOGLOBIN: 11.1 GM/DL (ref 12.5–16)
MCH RBC QN AUTO: 27.7 PG (ref 27–31)
MCHC RBC AUTO-ENTMCNC: 31.8 % (ref 32–36)
MCV RBC AUTO: 87 FL (ref 78–100)
OPIATES, URINE: NEGATIVE
OXYCODONE: NEGATIVE
PDW BLD-RTO: 12.5 % (ref 11.7–14.9)
PHENCYCLIDINE, URINE: NEGATIVE
PLATELET # BLD: 156 K/CU MM (ref 140–440)
PMV BLD AUTO: 11.7 FL (ref 7.5–11.1)
RBC # BLD: 4.01 M/CU MM (ref 4.2–5.4)
WBC # BLD: 9.4 K/CU MM (ref 4–10.5)

## 2022-07-08 PROCEDURE — 86900 BLOOD TYPING SEROLOGIC ABO: CPT

## 2022-07-08 PROCEDURE — 2580000003 HC RX 258: Performed by: OBSTETRICS & GYNECOLOGY

## 2022-07-08 PROCEDURE — 6360000002 HC RX W HCPCS: Performed by: OBSTETRICS & GYNECOLOGY

## 2022-07-08 PROCEDURE — 86901 BLOOD TYPING SEROLOGIC RH(D): CPT

## 2022-07-08 PROCEDURE — 6370000000 HC RX 637 (ALT 250 FOR IP): Performed by: OBSTETRICS & GYNECOLOGY

## 2022-07-08 PROCEDURE — 86850 RBC ANTIBODY SCREEN: CPT

## 2022-07-08 PROCEDURE — 80307 DRUG TEST PRSMV CHEM ANLYZR: CPT

## 2022-07-08 PROCEDURE — 85027 COMPLETE CBC AUTOMATED: CPT

## 2022-07-08 PROCEDURE — 1220000000 HC SEMI PRIVATE OB R&B

## 2022-07-08 RX ORDER — SODIUM CHLORIDE, SODIUM LACTATE, POTASSIUM CHLORIDE, CALCIUM CHLORIDE 600; 310; 30; 20 MG/100ML; MG/100ML; MG/100ML; MG/100ML
INJECTION, SOLUTION INTRAVENOUS CONTINUOUS
Status: DISCONTINUED | OUTPATIENT
Start: 2022-07-08 | End: 2022-07-09

## 2022-07-08 RX ORDER — LIDOCAINE HYDROCHLORIDE 10 MG/ML
30 INJECTION, SOLUTION EPIDURAL; INFILTRATION; INTRACAUDAL; PERINEURAL PRN
Status: DISCONTINUED | OUTPATIENT
Start: 2022-07-08 | End: 2022-07-09

## 2022-07-08 RX ORDER — FENTANYL CITRATE 50 UG/ML
100 INJECTION, SOLUTION INTRAMUSCULAR; INTRAVENOUS
Status: DISCONTINUED | OUTPATIENT
Start: 2022-07-08 | End: 2022-07-09 | Stop reason: HOSPADM

## 2022-07-08 RX ORDER — ONDANSETRON 2 MG/ML
4 INJECTION INTRAMUSCULAR; INTRAVENOUS EVERY 6 HOURS PRN
Status: DISCONTINUED | OUTPATIENT
Start: 2022-07-08 | End: 2022-07-09 | Stop reason: HOSPADM

## 2022-07-08 RX ORDER — FAMOTIDINE 10 MG/ML
20 INJECTION, SOLUTION INTRAVENOUS 2 TIMES DAILY PRN
Status: DISCONTINUED | OUTPATIENT
Start: 2022-07-08 | End: 2022-07-09 | Stop reason: HOSPADM

## 2022-07-08 RX ADMIN — Medication 25 MCG: at 23:39

## 2022-07-08 RX ADMIN — SODIUM CHLORIDE, POTASSIUM CHLORIDE, SODIUM LACTATE AND CALCIUM CHLORIDE: 600; 310; 30; 20 INJECTION, SOLUTION INTRAVENOUS at 09:30

## 2022-07-08 RX ADMIN — Medication 25 MCG: at 19:28

## 2022-07-08 RX ADMIN — Medication 1 MILLI-UNITS/MIN: at 09:59

## 2022-07-08 RX ADMIN — SODIUM CHLORIDE, POTASSIUM CHLORIDE, SODIUM LACTATE AND CALCIUM CHLORIDE: 600; 310; 30; 20 INJECTION, SOLUTION INTRAVENOUS at 17:47

## 2022-07-08 ASSESSMENT — PAIN DESCRIPTION - ORIENTATION: ORIENTATION: LOWER

## 2022-07-08 ASSESSMENT — PAIN DESCRIPTION - ONSET: ONSET: ON-GOING

## 2022-07-08 ASSESSMENT — PAIN DESCRIPTION - DESCRIPTORS: DESCRIPTORS: CRAMPING

## 2022-07-08 ASSESSMENT — PAIN SCALES - GENERAL
PAINLEVEL_OUTOF10: 4
PAINLEVEL_OUTOF10: 3

## 2022-07-08 ASSESSMENT — PAIN DESCRIPTION - LOCATION: LOCATION: ABDOMEN

## 2022-07-08 ASSESSMENT — PAIN DESCRIPTION - DIRECTION: RADIATING_TOWARDS: LOWER

## 2022-07-08 ASSESSMENT — PAIN DESCRIPTION - PAIN TYPE: TYPE: ACUTE PAIN

## 2022-07-08 ASSESSMENT — PAIN DESCRIPTION - FREQUENCY: FREQUENCY: INTERMITTENT

## 2022-07-08 ASSESSMENT — PAIN - FUNCTIONAL ASSESSMENT: PAIN_FUNCTIONAL_ASSESSMENT: ACTIVITIES ARE NOT PREVENTED

## 2022-07-08 NOTE — FLOWSHEET NOTE
Pitocin stopped per Dr. Denny Blanco order. Patient SVE remains at 1-2cm/thick/-3. Will allow patient to eat supper then place Cytotec per Dr. Denny Blanco order.

## 2022-07-08 NOTE — H&P
Health     Financial Resource Strain:     Difficulty of Paying Living Expenses: Not on file   Food Insecurity:     Worried About Running Out of Food in the Last Year: Not on file    Sagar of Food in the Last Year: Not on file   Transportation Needs:     Lack of Transportation (Medical): Not on file    Lack of Transportation (Non-Medical):  Not on file   Physical Activity:     Days of Exercise per Week: Not on file    Minutes of Exercise per Session: Not on file   Stress:     Feeling of Stress : Not on file   Social Connections:     Frequency of Communication with Friends and Family: Not on file    Frequency of Social Gatherings with Friends and Family: Not on file    Attends [employer] Services: Not on file    Active Member of [address] [place] [address] [employer] or Organizations: Not on file    Attends Club or Organization Meetings: Not on file    Marital Status: Not on file   Intimate Partner Violence:     Fear of Current or Ex-Partner: Not on file    Emotionally Abused: Not on file    Physically Abused: Not on file    Sexually Abused: Not on file   Housing Stability:     Unable to Pay for Housing in the Last Year: Not on file    Number of [place] in the Last Year: Not on file    Unstable Housing in the Last Year: Not on file     Family History:       Problem Relation Age of Onset    Other Mother         migraines    Other Father         migraines     Medications Prior to Admission:  Medications Prior to Admission: ALBUTEROL IN, Inhale into the lungs  Prenatal MV-Min-Fe Fum-FA-DHA (PRENATAL 1 PO), Take by mouth  acetaminophen (TYLENOL) 500 MG tablet, Take 500 mg by mouth every 6 hours as needed for Pain    REVIEW OF SYSTEMS:    CONSTITUTIONAL:  negative  RESPIRATORY:  negative  CARDIOVASCULAR:  negative  GASTROINTESTINAL:  negative  ALLERGIC/IMMUNOLOGIC:  negative  NEUROLOGICAL:  negative  BEHAVIOR/PSYCH:  negative    PHYSICAL EXAM:  Blood pressure 118/70, pulse (!) 107, temperature 97.5 °F (36.4 °C), temperature source Oral, resp. rate 18, last menstrual period 09/29/2021, SpO2 99 %, unknown if currently breastfeeding. General appearance:  awake, alert, cooperative, no apparent distress, and appears stated age  Neurologic:  Awake, alert, oriented to name, place and time.     Lungs:  No increased work of breathing, good air exchange  Abdomen:  Soft, non tender, gravid, consistent with her gestational age,   Fetal heart rate:    Baseline Heart Rate:  130        Accelerations:  present       Long Term Variability:  moderate       Decelerations:  absent       Pelvis:  Adequate pelvis  Cervix: 1-2/th per RN exam      Contraction frequency:  Not yet kenya    Membranes:  Intact    ASSESSMENT AND PLAN:    Labor: Admit, anticipate normal delivery, routine labor orders  Fetus: Reassuring  GBS: negative  Other: Dalton Busch MD

## 2022-07-09 ENCOUNTER — ANESTHESIA (OUTPATIENT)
Dept: LABOR AND DELIVERY | Age: 32
End: 2022-07-09
Payer: COMMERCIAL

## 2022-07-09 ENCOUNTER — ANESTHESIA EVENT (OUTPATIENT)
Dept: LABOR AND DELIVERY | Age: 32
End: 2022-07-09
Payer: COMMERCIAL

## 2022-07-09 PROCEDURE — 6360000002 HC RX W HCPCS: Performed by: OBSTETRICS & GYNECOLOGY

## 2022-07-09 PROCEDURE — 6370000000 HC RX 637 (ALT 250 FOR IP): Performed by: OBSTETRICS & GYNECOLOGY

## 2022-07-09 PROCEDURE — 1220000000 HC SEMI PRIVATE OB R&B

## 2022-07-09 PROCEDURE — 3700000025 EPIDURAL BLOCK: Performed by: ANESTHESIOLOGY

## 2022-07-09 PROCEDURE — 3E0P7VZ INTRODUCTION OF HORMONE INTO FEMALE REPRODUCTIVE, VIA NATURAL OR ARTIFICIAL OPENING: ICD-10-PCS | Performed by: OBSTETRICS & GYNECOLOGY

## 2022-07-09 PROCEDURE — 7200000001 HC VAGINAL DELIVERY

## 2022-07-09 PROCEDURE — 51702 INSERT TEMP BLADDER CATH: CPT

## 2022-07-09 PROCEDURE — 6360000002 HC RX W HCPCS: Performed by: NURSE ANESTHETIST, CERTIFIED REGISTERED

## 2022-07-09 RX ORDER — SODIUM CHLORIDE 0.9 % (FLUSH) 0.9 %
5-40 SYRINGE (ML) INJECTION PRN
Status: DISCONTINUED | OUTPATIENT
Start: 2022-07-09 | End: 2022-07-11 | Stop reason: HOSPADM

## 2022-07-09 RX ORDER — SODIUM CHLORIDE, SODIUM LACTATE, POTASSIUM CHLORIDE, CALCIUM CHLORIDE 600; 310; 30; 20 MG/100ML; MG/100ML; MG/100ML; MG/100ML
INJECTION, SOLUTION INTRAVENOUS CONTINUOUS
Status: DISCONTINUED | OUTPATIENT
Start: 2022-07-09 | End: 2022-07-11 | Stop reason: HOSPADM

## 2022-07-09 RX ORDER — LANOLIN 100 %
OINTMENT (GRAM) TOPICAL PRN
Status: DISCONTINUED | OUTPATIENT
Start: 2022-07-09 | End: 2022-07-11 | Stop reason: HOSPADM

## 2022-07-09 RX ORDER — IBUPROFEN 800 MG/1
800 TABLET ORAL EVERY 8 HOURS
Status: DISCONTINUED | OUTPATIENT
Start: 2022-07-09 | End: 2022-07-11 | Stop reason: HOSPADM

## 2022-07-09 RX ORDER — SODIUM CHLORIDE 9 MG/ML
INJECTION, SOLUTION INTRAVENOUS PRN
Status: DISCONTINUED | OUTPATIENT
Start: 2022-07-09 | End: 2022-07-11 | Stop reason: HOSPADM

## 2022-07-09 RX ORDER — ONDANSETRON 2 MG/ML
4 INJECTION INTRAMUSCULAR; INTRAVENOUS EVERY 6 HOURS PRN
Status: DISCONTINUED | OUTPATIENT
Start: 2022-07-09 | End: 2022-07-11 | Stop reason: HOSPADM

## 2022-07-09 RX ORDER — FAMOTIDINE 20 MG/1
20 TABLET, FILM COATED ORAL 2 TIMES DAILY PRN
Status: DISCONTINUED | OUTPATIENT
Start: 2022-07-09 | End: 2022-07-11 | Stop reason: HOSPADM

## 2022-07-09 RX ORDER — NALOXONE HYDROCHLORIDE 0.4 MG/ML
INJECTION, SOLUTION INTRAMUSCULAR; INTRAVENOUS; SUBCUTANEOUS PRN
Status: DISCONTINUED | OUTPATIENT
Start: 2022-07-09 | End: 2022-07-09

## 2022-07-09 RX ORDER — ROPIVACAINE HYDROCHLORIDE 2 MG/ML
12 INJECTION, SOLUTION EPIDURAL; INFILTRATION; PERINEURAL CONTINUOUS
Status: DISCONTINUED | OUTPATIENT
Start: 2022-07-09 | End: 2022-07-09

## 2022-07-09 RX ORDER — DOCUSATE SODIUM 100 MG/1
100 CAPSULE, LIQUID FILLED ORAL 2 TIMES DAILY
Status: DISCONTINUED | OUTPATIENT
Start: 2022-07-09 | End: 2022-07-11 | Stop reason: HOSPADM

## 2022-07-09 RX ORDER — SODIUM CHLORIDE 0.9 % (FLUSH) 0.9 %
5-40 SYRINGE (ML) INJECTION EVERY 12 HOURS SCHEDULED
Status: DISCONTINUED | OUTPATIENT
Start: 2022-07-09 | End: 2022-07-11 | Stop reason: HOSPADM

## 2022-07-09 RX ORDER — ALBUTEROL SULFATE 90 UG/1
2 AEROSOL, METERED RESPIRATORY (INHALATION) EVERY 4 HOURS PRN
Status: DISCONTINUED | OUTPATIENT
Start: 2022-07-09 | End: 2022-07-11 | Stop reason: HOSPADM

## 2022-07-09 RX ORDER — ACETAMINOPHEN 500 MG
1000 TABLET ORAL EVERY 8 HOURS PRN
Status: DISCONTINUED | OUTPATIENT
Start: 2022-07-09 | End: 2022-07-11 | Stop reason: HOSPADM

## 2022-07-09 RX ADMIN — IBUPROFEN 800 MG: 800 TABLET, FILM COATED ORAL at 21:47

## 2022-07-09 RX ADMIN — DOCUSATE SODIUM 100 MG: 100 CAPSULE, LIQUID FILLED ORAL at 21:47

## 2022-07-09 RX ADMIN — Medication 1 MILLI-UNITS/MIN: at 03:46

## 2022-07-09 RX ADMIN — IBUPROFEN 800 MG: 800 TABLET, FILM COATED ORAL at 11:14

## 2022-07-09 RX ADMIN — ROPIVACAINE HYDROCHLORIDE 12 ML/HR: 2 INJECTION, SOLUTION EPIDURAL; INFILTRATION at 04:55

## 2022-07-09 RX ADMIN — ROPIVACAINE HYDROCHLORIDE 10 ML: 2 INJECTION, SOLUTION EPIDURAL; INFILTRATION at 04:51

## 2022-07-09 RX ADMIN — DOCUSATE SODIUM 100 MG: 100 CAPSULE, LIQUID FILLED ORAL at 11:14

## 2022-07-09 ASSESSMENT — PAIN SCALES - GENERAL
PAINLEVEL_OUTOF10: 3
PAINLEVEL_OUTOF10: 0
PAINLEVEL_OUTOF10: 2
PAINLEVEL_OUTOF10: 5
PAINLEVEL_OUTOF10: 0
PAINLEVEL_OUTOF10: 0

## 2022-07-09 ASSESSMENT — PAIN DESCRIPTION - DESCRIPTORS
DESCRIPTORS: CRAMPING
DESCRIPTORS: DISCOMFORT;SORE

## 2022-07-09 ASSESSMENT — PAIN - FUNCTIONAL ASSESSMENT
PAIN_FUNCTIONAL_ASSESSMENT: ACTIVITIES ARE NOT PREVENTED

## 2022-07-09 ASSESSMENT — PAIN DESCRIPTION - ONSET
ONSET: GRADUAL
ONSET: GRADUAL

## 2022-07-09 ASSESSMENT — PAIN DESCRIPTION - ORIENTATION
ORIENTATION: LOWER
ORIENTATION: LOWER

## 2022-07-09 ASSESSMENT — PAIN DESCRIPTION - FREQUENCY
FREQUENCY: INTERMITTENT
FREQUENCY: INTERMITTENT

## 2022-07-09 ASSESSMENT — PAIN DESCRIPTION - LOCATION
LOCATION: ABDOMEN
LOCATION: BACK

## 2022-07-09 ASSESSMENT — PAIN DESCRIPTION - PAIN TYPE
TYPE: ACUTE PAIN
TYPE: ACUTE PAIN

## 2022-07-09 NOTE — L&D DELIVERY NOTE
Mother's Information    Labor Events     Labor?: No  Cervical Ripening:   Now         [facility] Pending Yeny Bates [[id number]]    Labor Events     Labor?: No   Steroids?: None  Cervical Ripening Date/Time: 22 19:38:00   Cervical Ripening Type: Misoprostol  Antibiotics Received during Labor?: No  Rupture Date/Time: 22 04:14:00   Rupture Type: SROM  Fluid Color: Clear  Fluid Odor: None  Induction: Misoprostol  Augmentation: Oxytocin     Anesthesia    Method: Epidural     Start Pushing    Labor onset date/time: 22 04:14:00 Now   Dilation complete date/time: 22 06:35:00 EDT Now   Start pushing date/time:    Decision date/time (emergent ):       Delivery ([place])    Delivery Date/Time:  22 06:51:00   Delivery Type: Vaginal, Spontaneous    Details:        [place] Presentation    Presentation: Vertex     Shoulder Dystocia    Shoulder Dystocia Present?: No  Add Second Maneuver  Add Third Maneuver  Add Fourth Maneuver  Add Fifth Maneuver  Add Sixth Maneuver  Add Seventh Maneuver  Add Eighth Maneuver  Add Ninth Maneuver     Assisted Delivery Details    Forceps Attempted?: No  Vacuum Extractor Attempted?: No     Document Additional Attempt       Document Additional Attempt             Cord       Placenta    Date/Time: 2022 06:55:22  Removal: Spontaneous  Appearance: Intact  Disposition: Placenta Refrigerator     Lacerations    Episiotomy: None  Perineal Lacerations: None  Other Lacerations: no non-perineal laceration     Vaginal Counts    Initial Count Personnel: Ajay De Luna RN  Initial Count Verified By: Triny Bray RN    Sponges Needles Instruments   Initial Counts Correct Correct Correct   Final Counts Correct Correct Correct   Final Count Personnel: [email or web address]  Final Count Verified By: Ajay De Luna RN  Accurate Final Count?: Yes  If the count is incorrect due to Intentionally Retained Foreign Object (IRFO) add the IRFO LDA in Lines/Drains.   Add LDA: Link to LDA     Blood Loss  Mother: Chaya Plant #[id number]   Start of Mother's Information    Delivery Blood Loss  22 0414 - 22 0704    None           End of Mother's Information  Mother: Chaya Plant #[id number]          Delivery Providers    Delivering clinician: Jean Carlos Brice MD     Provider Role    Kady Cason MD Obstetrician    José Jenkins, RN Primary Nurse    Jocelyn Perez, RN Primary [place] Nurse     NICU Nurse     Neonatologist    Neill Curling, APRN - CRNA Anesthesiologist     Nurse Anesthetist     Nurse Practitioner     Midwife    Jocelyn Perez, DERRICK Nursery Nurse    Larry Skelton RN Registered Nurse    Rolando Gentile, RN Registered Nurse          [place] Assessment    Living Status: Living     Apgar Scoring Key:    0 1 2    Skin Color: Blue or pale Acrocyanotic Completely pink    Heart Rate: Absent <100 bpm >100 bpm    Reflex Irritability: No response Grimace Cry or active withdrawal    Muscle Tone: Limp Some flexion Active motion    Respiratory Effort: Absent Weak cry; hypoventilation Good, crying                  Skin Color:   Heart Rate:   Reflex Irritability:   Muscle Tone:   Respiratory Effort:    Total:            1 Minute:        Apgar 1 total from OB History    5 Minute:        Apgar 5 total from OB History    10 Minute:              15 Minute:              20 Minute:                             Resuscitation    Method: Bulb Suction, Stimulation            Measurements           Title    Skin to Skin Initiation Date/Time:     Skin to Skin End Date/Time:                Department of Obstetrics and Gynecology  Spontaneous Vaginal Delivery Note    Labor & Delivery Summary  Dilation Complete Date: 22  Dilation Complete Time: 635    Pre-operative Diagnosis:  Term pregnancy    Post-operative Diagnosis:  Same + live female wt: not yet weighed    Procedure:  Spontaneous vaginal delivery    Surgeon:  Jean Carlos Brice MD    Information for the patient's :  Leobardo Sierra [[id number]]          Anesthesia:  epidural anesthesia    Estimated blood loss:  300 mL    Specimen:  Placenta not sent to pathology     Cord blood sent No    Complications:  none    Condition:  infant stable to general nursery and mother stable    Details of Procedure: The patient is a 29 y.o. female at 36w3d   OB History        4    Para   3    Term   3            AB        Living   3       SAB        IAB        Ectopic        Molar        Multiple   0    Live Births   3             who was admitted for induction. She received the following interventions: ARBOW, vaginal Cytotec and IV Pitocin augmentation She was known to be GBS negative and did not receive antibiotic prophylaxis. The patient progressed well,did receive an epidural, became complete and started to push. After pushing for 3 contractions the fetal head was at the perineum, nose and mouth suctioned with bulb suction and the rest of the infant delivered atraumatically, placed on mother abdomen. Cord was clamped and cut and infant handed off to the waiting nurse for evaluation. The delivery of the placenta was spontaneous. The perineum and vagina were explored and no lacerations were noted Infant's name is Shyanne.

## 2022-07-09 NOTE — LACTATION NOTE
Visited and set up with the dual electric breast pump-as baby is in SCN. Mom is encouraged to pump every 2-3 hrs for optimal stimulation. Collection bottles and labels given and milk storage guide lines are reviewed. Mom is encouraged to call for assistance PRN.  Lita Campbell

## 2022-07-09 NOTE — PROGRESS NOTES
Pt positioned on side of bed at: 0434  CRNA at bedside for epidural procedure: 0435       Reviewing epidural procedure, risks and benefits of procedure. Pt voiced understandin     Time out performed for procedure: 438  Start: 439  Epi In: 444  Test dose: 0445  Bolus dose: 0451       This RN remained at bedside during entire procedure. At times, monitor tracing maternal heart rate. RN adjusted US. Audible FHR noted and WDL. Pt tolerated procedure well.     Repositioned back in bed with left tilt. Call light within reach and bed in lowest position.

## 2022-07-09 NOTE — ANESTHESIA PROCEDURE NOTES
Epidural Block    Patient location during procedure: OB  Start time: 7/9/2022 4:38 AM  End time: 7/9/2022 4:44 AM  Reason for block: labor epidural  Staffing  Performed: resident/CRNA   Resident/CRNA: CAMPBELL Dejesus CRNA  Epidural  Patient position: sitting  Prep: ChloraPrep and site prepped and draped  Patient monitoring: continuous pulse ox and frequent blood pressure checks  Location: L4-5  Injection technique: GHADA saline  Provider prep: mask and sterile gloves  Needle  Needle type: Tuohy   Needle gauge: 17 G  Needle length: 3.5 in  Needle insertion depth: 6 cm  Catheter type: side hole  Catheter size: 19 G  Catheter at skin depth: 12 cm  Test dose: negativeCatheter Secured: tegaderm and tape  Assessment  Sensory level: T8  Hemodynamics: stable  Attempts: 1  Outcomes: patient tolerated procedure well  Preanesthetic Checklist  Completed: patient identified, IV checked, site marked, risks and benefits discussed, surgical/procedural consents, equipment checked, pre-op evaluation, timeout performed, anesthesia consent given, oxygen available, monitors applied/VS acknowledged, fire risk safety assessment completed and verbalized and blood product R/B/A discussed and consented

## 2022-07-09 NOTE — ANESTHESIA PRE PROCEDURE
Department of Anesthesiology  Preprocedure Note       Name:  Kvng Rowley   Age:  29 y.o.  :  1990                                          MRN:  [id number]         Date:  2022      Surgeon: * No surgeons listed *    Procedure: * No procedures listed *    Medications prior to admission:   Prior to Admission medications    Medication Sig Start Date End Date Taking? Authorizing Provider   ALBUTEROL IN Inhale into the lungs    Historical Provider, MD   Prenatal MV-Min-Fe Fum-FA-DHA (PRENATAL 1 PO) Take by mouth    Historical Provider, MD   acetaminophen (TYLENOL) 500 MG tablet Take 500 mg by mouth every 6 hours as needed for Pain    Historical Provider, MD       Current medications:    Current Facility-Administered Medications   Medication Dose Route Frequency Provider Last Rate Last Admin    lactated ringers infusion   IntraVENous Continuous Kady López  mL/hr at 22 1747 New Bag at 22 1747    oxytocin (PITOCIN) 30 units in 500 mL infusion  1-20 ophelia-units/min IntraVENous Continuous Kady López MD 1 mL/hr at 22 0346 1 ophelia-units/min at 22 0346    oxytocin (PITOCIN) 30 units in 500 mL infusion  87.3 ophelia-units/min IntraVENous Continuous PRN Kady López MD        And    oxytocin (PITOCIN) 30 units in 500 mL infusion  10 Units IntraVENous PRN Kady López MD        lidocaine PF 1 % injection 30 mL  30 mL Other PRN Kady López MD        fentaNYL (SUBLIMAZE) injection 100 mcg  100 mcg IntraVENous Q1H PRN Marya Perez MD        famotidine (PEPCID) injection 20 mg  20 mg IntraVENous BID PRN Marya Perez MD        [facility]ron [facility]) injection 4 mg  4 mg IntraVENous Q6H PRN Kady López MD        miSOPROStol (CYTOTEC) pre-split tablet TABS 25 mcg  25 mcg Vaginal Q4H Kady López MD   25 mcg at 22 0629       Allergies:     Allergies   Allergen Reactions    Latex Itching    Avocado Anaphylaxis    Banana Anaphylaxis    Phenergan [Promethazine] Other (See Comments)     Sweating, HTN, couldn't speak, restlessness, agitation    Ceclor [Cefaclor] Rash    Cefzil [Cefprozil] Rash    Penicillins Rash    Sulfa Antibiotics Rash       Problem List:    Patient Active Problem List   Diagnosis Code    Uterine contractions O47.9    Encounter for triage in pregnant patient Z37.80    Normal pregnancy in multigravida in third trimester Z34.83       Past Medical History:        Diagnosis Date    Anxiety     Arthritis     back, elbow & wrists    Asthma     Last flare up: 3/15/2020    Back pain     compressed discs, L4 and L5, herniated disc    Depression     Migraines     Last migraine: 5/14/20       Past Surgical History:        Procedure Laterality Date    WISDOM TOOTH EXTRACTION  08/2010       Social History:    Social History     Tobacco Use    Smoking status: Never Smoker    Smokeless tobacco: Never Used   Substance Use Topics    Alcohol use: Not Currently     Comment: rarely - 1 drink a month                                Counseling given: Not Answered      Vital Signs (Current):   Vitals:    07/08/22 2245 07/08/22 2304 07/09/22 0206 07/09/22 0414   BP:  128/64 (!) 87/55 (!) 95/54   Pulse:  74 69 67   Resp:  16 14    Temp:  36.8 °C (98.3 °F) 36.7 °C (98.1 °F)    TempSrc:  Oral Oral    SpO2:  100% 99%    Weight: 192 lb (87.1 kg)                                                 BP Readings from Last 3 Encounters:   07/09/22 (!) 95/54   07/04/22 118/72   06/08/22 (!) 108/55       NPO Status:                                                                                 BMI:   Wt Readings from Last 3 Encounters:   07/08/22 192 lb (87.1 kg)   06/08/22 192 lb (87.1 kg)   07/07/20 213 lb (96.6 kg)     Body mass index is 30.99 kg/m².     CBC:   Lab Results   Component Value Date/Time    WBC 9.4 07/08/2022 09:30 AM    RBC 4.01 07/08/2022 09:30 AM    HGB 11.1 07/08/2022 09:30 AM    HCT 34.9 07/08/2022 09:30 AM    MCV 87.0 07/08/2022 09:30 AM RDW 12.5 07/08/2022 09:30 AM     07/08/2022 09:30 AM       CMP: No results found for: NA, K, CL, CO2, BUN, CREATININE, GFRAA, AGRATIO, LABGLOM, GLUCOSE, GLU, PROT, CALCIUM, BILITOT, ALKPHOS, AST, ALT    POC Tests: No results for input(s): POCGLU, POCNA, POCK, POCCL, POCBUN, POCHEMO, POCHCT in the last 72 hours. Coags:   Lab Results   Component Value Date/Time    PROTIME 10.9 05/20/2020 08:15 AM    INR 0.90 05/20/2020 08:15 AM    APTT 26.0 05/20/2020 08:15 AM       HCG (If Applicable): No results found for: PREGTESTUR, PREGSERUM, HCG, HCGQUANT     ABGs: No results found for: PHART, PO2ART, RTR8WGK, HOJ4WUP, BEART, J6CZWZKL     Type & Screen (If Applicable):  No results found for: LABABO, LABRH    Drug/Infectious Status (If Applicable):  No results found for: HIV, HEPCAB    COVID-19 Screening (If Applicable):   Lab Results   Component Value Date/Time    COVID19 NOT DETECTED 06/23/2020 09:39 AM           Anesthesia Evaluation  Patient summary reviewed no history of anesthetic complications:   Airway: Mallampati: II  TM distance: >3 FB   Neck ROM: full  Mouth opening: > = 3 FB   Dental: normal exam         Pulmonary:normal exam    (+) asthma:                            Cardiovascular:Negative CV ROS  Exercise tolerance: good (>4 METS),            Beta Blocker:  Not on Beta Blocker         Neuro/Psych:   (+) headaches: migraine headaches, depression/anxiety             GI/Hepatic/Renal: Neg GI/Hepatic/Renal ROS            Endo/Other: Negative Endo/Other ROS                    Abdominal:             Vascular: negative vascular ROS. Other Findings:           Anesthesia Plan      epidural     ASA 2             Anesthetic plan and risks discussed with patient. CAMPBELL Pringle CRNA   7/9/2022    Pre Anesthesia Evaluation complete. Anesthesia plan, risks, benefits, alternatives, and personnel discussed with patient and/or legal guardian.  Patient and/or legal guardian verbalized

## 2022-07-09 NOTE — PROGRESS NOTES
RN remained at bedside throughout pushing. EFM continuously assessed. Viable baby girl born @ 12 via   Apgars 8 & 8.

## 2022-07-09 NOTE — ANESTHESIA POSTPROCEDURE EVALUATION
Department of Anesthesiology  Postprocedure Note    Patient: Kimberley Montague  MRN: [id number]  [place]: 1990  Date of evaluation: 7/9/2022      Procedure Summary     Date: 07/09/22 Room / Location:     Anesthesia Start: [address] [place] [address] Anesthesia Stop: 8309    Procedure: Labor Analgesia Diagnosis:     Scheduled Providers:  Responsible Provider: Claudia Ledezma MD    Anesthesia Type: epidural ASA Status: 2          Anesthesia Type: No value filed.     Kaci Phase I: 10  Kaci Phase II:  10      Anesthesia Post Evaluation    Patient location during evaluation: bedside  Patient participation: complete - patient participated  Level of consciousness: awake and alert  Pain score: 1  Airway patency: patent  Nausea & Vomiting: no nausea and no vomiting  Complications: no  Cardiovascular status: hemodynamically stable  Respiratory status: acceptable, room air, spontaneous ventilation and nonlabored ventilation  Hydration status: euvolemic

## 2022-07-09 NOTE — PLAN OF CARE
Problem: Pain  Goal: Verbalizes/displays adequate comfort level or baseline comfort level  Outcome: Progressing     Problem: Vaginal Birth or  Section  Goal: Fetal and maternal status remain reassuring during the birth process  Description:  Birth OB-Pregnancy care plan goal which identifies if the fetal and maternal status remain reassuring during the birth process  Outcome: Progressing     Problem: Infection - Adult  Goal: Absence of infection during hospitalization  Outcome: Progressing     Problem: Safety - Adult  Goal: Free from fall injury  Outcome: Progressing

## 2022-07-10 PROCEDURE — 1220000000 HC SEMI PRIVATE OB R&B

## 2022-07-10 PROCEDURE — 6370000000 HC RX 637 (ALT 250 FOR IP): Performed by: OBSTETRICS & GYNECOLOGY

## 2022-07-10 PROCEDURE — 94761 N-INVAS EAR/PLS OXIMETRY MLT: CPT

## 2022-07-10 RX ADMIN — DOCUSATE SODIUM 100 MG: 100 CAPSULE, LIQUID FILLED ORAL at 21:30

## 2022-07-10 RX ADMIN — DOCUSATE SODIUM 100 MG: 100 CAPSULE, LIQUID FILLED ORAL at 08:26

## 2022-07-10 RX ADMIN — IBUPROFEN 800 MG: 800 TABLET, FILM COATED ORAL at 08:25

## 2022-07-10 ASSESSMENT — PAIN DESCRIPTION - LOCATION
LOCATION: BACK
LOCATION: BACK
LOCATION: ABDOMEN
LOCATION: BACK

## 2022-07-10 ASSESSMENT — PAIN SCALES - GENERAL
PAINLEVEL_OUTOF10: 0
PAINLEVEL_OUTOF10: 3
PAINLEVEL_OUTOF10: 2
PAINLEVEL_OUTOF10: 2
PAINLEVEL_OUTOF10: 0
PAINLEVEL_OUTOF10: 0
PAINLEVEL_OUTOF10: 2
PAINLEVEL_OUTOF10: 0

## 2022-07-10 ASSESSMENT — PAIN DESCRIPTION - DESCRIPTORS
DESCRIPTORS: SORE
DESCRIPTORS: CRAMPING
DESCRIPTORS: SORE
DESCRIPTORS: SORE

## 2022-07-10 ASSESSMENT — PAIN - FUNCTIONAL ASSESSMENT
PAIN_FUNCTIONAL_ASSESSMENT: ACTIVITIES ARE NOT PREVENTED

## 2022-07-10 ASSESSMENT — PAIN DESCRIPTION - ORIENTATION
ORIENTATION: LOWER

## 2022-07-10 ASSESSMENT — PAIN DESCRIPTION - ONSET
ONSET: ON-GOING
ONSET: GRADUAL
ONSET: ON-GOING

## 2022-07-10 ASSESSMENT — PAIN DESCRIPTION - FREQUENCY
FREQUENCY: CONTINUOUS

## 2022-07-10 ASSESSMENT — PAIN DESCRIPTION - PAIN TYPE
TYPE: ACUTE PAIN

## 2022-07-10 NOTE — PLAN OF CARE
Problem: Pain  Goal: Verbalizes/displays adequate comfort level or baseline comfort level  Outcome: Progressing  Flowsheets (Taken 7/9/2022 1057 by Noemi Garcia RN)  Verbalizes/displays adequate comfort level or baseline comfort level: Encourage patient to monitor pain and request assistance     Problem: Infection - Adult  Goal: Absence of infection during hospitalization  Outcome: Progressing     Problem: Safety - Adult  Goal: Free from fall injury  Outcome: Progressing

## 2022-07-10 NOTE — PROGRESS NOTES
Subjective:     Postpartum Day 1:   The patient feels well. The patient denies emotional concerns. Pain is well controlled with current medications. The baby iswell. The patient is ambulating well. The patient is tolerating a normal diet. Objective:        Vitals:    07/10/22 0412   BP: 103/68   Pulse: 89   Resp: 18   Temp: 98.3 °F (36.8 °C)   SpO2: 98%       Lab Results   Component Value Date    WBC 9.4 07/08/2022    HGB 11.1 (L) 07/08/2022    HCT 34.9 (L) 07/08/2022    MCV 87.0 07/08/2022     07/08/2022       General:    alert, appears stated age and cooperative       Lochia:  appropriate   Uterine    firm       DVT Evaluation:  No evidence of DVT seen on physical exam.     Assessment:     Postpartum day 1 Doing well post delivery. Plan:     Continue current care.     Jenna Johnson MD 7/10/2022 9:34 AM

## 2022-07-10 NOTE — PLAN OF CARE
Problem: Pain  Goal: Verbalizes/displays adequate comfort level or baseline comfort level  7/10/2022 0844 by Karishma Robles RN  Outcome: Progressing  Flowsheets (Taken 7/10/2022 0825)  Verbalizes/displays adequate comfort level or baseline comfort level: Encourage patient to monitor pain and request assistance  7/9/2022 2205 by Eagle Rios RN  Outcome: Progressing  Flowsheets (Taken 7/9/2022 1057 by Karishma Robles RN)  Verbalizes/displays adequate comfort level or baseline comfort level: Encourage patient to monitor pain and request assistance

## 2022-07-11 VITALS
BODY MASS INDEX: 31.34 KG/M2 | TEMPERATURE: 98.5 F | HEART RATE: 78 BPM | DIASTOLIC BLOOD PRESSURE: 72 MMHG | HEIGHT: 66 IN | WEIGHT: 195 LBS | OXYGEN SATURATION: 100 % | RESPIRATION RATE: 18 BRPM | SYSTOLIC BLOOD PRESSURE: 110 MMHG

## 2022-07-11 RX ORDER — IBUPROFEN 800 MG/1
800 TABLET ORAL EVERY 8 HOURS
Qty: 40 TABLET | Refills: 1 | Status: SHIPPED | OUTPATIENT
Start: 2022-07-11

## 2022-07-11 ASSESSMENT — PAIN SCALES - GENERAL
PAINLEVEL_OUTOF10: 0

## 2022-07-11 NOTE — PROGRESS NOTES
Department of Obstetrics and Gynecology  Labor and Delivery   Post Partum Progress Note      SUBJECTIVE:  Doing well with no complaints. Reports bleeding is decreasing and pain is well controlled with medication. Has voided without difficulty. Has not had BM, but + flatus. Eating and drinking well. Denies HA/visual changes/epigastric pain. Breastfeeding is going well. Reports good social support. Denies emotional concerns. OBJECTIVE:      Vitals:  /63   Pulse 72   Temp 98.4 °F (36.9 °C) (Oral)   Resp 17   Ht 5' 6\" (1.676 m)   Wt 195 lb (88.5 kg)   LMP 2021   SpO2 99%   Breastfeeding Unknown   BMI 31.47 kg/m²   Lab Results   Component Value Date    WBC 9.4 2022    HGB 11.1 (L) 2022    HCT 34.9 (L) 2022    MCV 87.0 2022     2022       ABDOMEN:  Soft, non-tender. Fundus firm at u-1. BS present x 4 quadrants. LOCHIA: Normal per pt  LUNGS: CTAB  HEART: RRR  EXTREMITIES: No calf tenderness, erythema or swelling bilaterally       ASSESSMENT:      PPD # 2  S/p   Breastfeeding well    PLAN:     Will plan for discharge today. Discharge teaching completed including counseling on warning signs (heavy vaginal bleeding, s/s of preeclampsia, fever >100.4, ACHES, s/s of PPD). Rx for colace and ibuprofen. Pt to schedule f/u pp visit at 6 weeks in the office.        Raymondo Phalen, APRN - CNM

## 2022-07-11 NOTE — FLOWSHEET NOTE
Postpartum and infant care discharge teaching completed. Copy of discharge instructions signed by patient and  witnessed by RN. No further questions on teaching points voiced. Prescription given with instructions on next dosage times and information about drug being taken. Patient plans to follow-up with [facility] Provider for herself in 6 weeks and Pediatric provider for infant in 2 days. ID bands checked. One of baby's ID bands removed and stapled to discharge footprint sheet, signed by patient and witnessed by RN. Patient discharged in stable condition accompanied by family/guardian. Discharged baby in infant car seat.

## 2022-07-11 NOTE — DISCHARGE SUMMARY
Obstetrical Discharge Form    Gestational Age:  39w1d    Antepartum complications: polyhydramnios  Date of Delivery:   2022    Type of Delivery:   vaginal, spontaneous    Delivered By:   Anabelle Bautista MD             Baby:       Information for the patient's :  [facility] Girl Yeny Jana [[id number]]        Anesthesia:    Epidural    Intrapartum complications: None    Feeding method:   breast    Postpartum complications: none    Discharge Date:  2022     Condition of discharge:  good    Plan:   Follow up    in 6 week(s)

## 2022-07-11 NOTE — LACTATION NOTE
Visited. Mom says baby is breast feeding well. Mom denies soreness or concerns. Discharge is planned for today. Mom is encouraged to call PRN.     Alex Michelle

## 2022-07-11 NOTE — PLAN OF CARE
Problem: Pain  Goal: Verbalizes/displays adequate comfort level or baseline comfort level  Outcome: Progressing  Flowsheets (Taken 7/10/2022 2130)  Verbalizes/displays adequate comfort level or baseline comfort level:   Encourage patient to monitor pain and request assistance   Assess pain using appropriate pain scale     Problem: Infection - Adult  Goal: Absence of infection during hospitalization  Outcome: Progressing     Problem: Safety - Adult  Goal: Free from fall injury  Outcome: Progressing numerical 0-10

## 2022-07-11 NOTE — ADT AUTH CERT
Ree Chapa #[id number] (FD) (29 y.o.  F) (Adm: 22)  [facility] [id number]    Delivery Summary      Ree Chapa [[id number]]   (22 to present)  Birth Date: [birth year] Age (as of 22): 32 Ethnicity: Non- / Non  Race: White (non-)   History: [id number] Estimated Date of Delivery: 07/15/22 Gestational Age: 36w3d Blood Type: A POSITIVE     OB History       4    Para   4    Term   4            AB        Living   4      SAB        IAB        Ectopic        Molar        Multiple   0    Live Births   4         # Outcome Date GA Labor/2nd Weight Sex Delivery Anes PTL Lv A1 A5   1 Term 16 40w3d  8 lb 15 oz (4.054 kg) M Vag-Spont Epidural Y Living        2 Term 18 39w3d  7 lb 15 oz (3.6 kg) F Vag-Spont Epidural  Living     Name: Roxann Rivera   Location: Other   Delivering Clinician: Dr Nghia Weldon      3 Term 20 40w3d 2h 23m / 0h 23m 7 lb 12 oz (3.515 kg) M Vag-Spont Epidural N Living 9 9   Name: Valerio Chacon   Location: Other   Delivering Clinician: Rachel Gates MD      4 Term 22 39w1d 2h 21m / 0h 16m 7 lb 12.2 oz (3.52 kg) F Vag-Spont Epidural N Living 8 8   Name: Tamara Bright   Location: Other   Delivering Clinician: Simeon Hicks MD        Dating Summary    Working KELLY: 07/15/22 set by Phyllis Souza RN on 22 based on Patient Reported     Based On KELLY GA Diff GA User Date   Last Menstrual Period on 21 +1w2d  System action - copied 22   Patient Reported 07/15/22 Working  Phyllis Souza RN 22     Prenatal Results      1st Trimester    Test Value Reference Range Date Time   ABO/Rh  A POSITIVE   22 1441   Antibody       HCT       HGB       Rubella       RPR       Urine Prot       HBsAg       HIV       Gonorrhea       Chlamydia       TSH       TB       Pap         2nd Trimester    Test Value Reference Range Date Time   HCT       HGB       Glucose         3rd Trimester    Test Value Reference Range Date Time   HCT  34.9 % Important  37 - 47 22 0930   HGB  11.1 GM/DL Important  12.5 - 16.0 22 0930   GBS          Genetic Screening    Test Value Reference Range Date Time   BUN       Cystic Fibrosis       Canavan       Thalessemia       Sickle Cell       [facility]         External Labs    Test Value Reference Range Date Time   ABO * A   21      * A   21    Rh Factor * positive   21    Rhogam       HIV * non reactive   21    RPR * Non-reactive   21    Rubella Titer * immune   21    Hepatitis B * negative   21    C. Trachomatis * negative   21    N.  Gonorrhoeae * negative   21    GBS * negative   22    Hepatitis C Antibody            Legend    *: Historical  View all results for this pregnancy         Brennan Veronica [[id number]]       Information    Head delivery date/time: 2022 06:51:00   Changing the 's delivery date/time could affect patient care.:    Delivery date/time:  22 0109   Delivery type: Vaginal, Spontaneous   Details:       Start Pushing    Labor onset date/time: 22 04:14:00 Now   Dilation complete date/time: 22 06:35:00 EDT Now   Start pushing date/time: 2022 06:48:00   Decision date/time (emergent ):      Labor Length    1st stage: 2h 21m  2nd stage: 0h 16m  3rd stage: 0h 04m    Delivery Providers    Delivering clinician: Janette Griffin MD  Provider Role   Kady Forrest MD Obstetrician   Alena Doshi RN Primary Nurse   Emma Aguayo, RN Primary  Nurse    NICU Nurse    Neonatologist   Andrea Florez APRN - CRNA Anesthesiologist    Nurse Anesthetist    Nurse Practitioner    Midwife   Emma Aguayo, RN Nursery Nurse   Enid Joseph RN Registered Nurse   Yovana Villagomez RN Registered Nurse     Anesthesia    Method: Epidural    Presentation    Presentation: Vertex  Position: Occiput Anterior    Operative Delivery    Forceps attempted?: No  Vacuum extractor attempted?: No    Shoulder Dystocia    Shoulder dystocia present?: No     Measurements    Weight: 3520 g Length: 53.3 cm   Head circumference: 37 cm Chest circumference: 34.5 cm   Abdominal girth: 35 cm      Lacerations    Episiotomy: None  Perineal laceration: None  Other lacerations?: No    Placenta    Placenta delivery date/time: 2022 0655  Placenta removal: Spontaneous  Placenta appearance: Intact  Placenta disposition: Placenta Refrigerator    Vaginal Counts    Initial count personnel: Wilian Galeano RN  Initial count verified by: Wilhemina Holter RN    [employer]   Initial counts Correct Correct Correct   Final counts Correct Correct Correct   Final count personnel:   Final count verified by: Wilian Galeano RN  Accurate final count?: Yes    Skin to Skin    Skin to skin initiated date/time: 2022 0705  Skin to skin with: Mother    Last edited by Javier Red on 22 at [facility] [[id number]]       Information    Head delivery date/time: 2022 06:51:00   Changing the 's delivery date/time could affect patient care.:    Delivery date/time:  22 7727   Delivery type: Vaginal, Spontaneous   Details:       Delivery Providers    Delivering clinician: Yolanda Franco MD  Provider Role   Kady Cesar MD Obstetrician   Sonya Pascual RN Primary Nurse   Sourav Lugo, RN Primary  Nurse    NICU Nurse    Neonatologist   Rena Potter APRN - CRNA Anesthesiologist    Nurse Anesthetist    Nurse Practitioner    Midwife   Sourav Lugo RN Nursery Nurse   Kecia Zavaleta RN Registered Nurse   Sakshi Rajan RN Registered Nurse     Apgars    Living status: Living   Apgars assigned by: Kelly PÉREZ RN     Apgars  Component 1 min. 5 min.    Skin color:  0  0    Heart rate:  2  2    Reflex irritability:  2  2    Muscle tone:  2  2    Respiratory effort:  2  2    Total:  8  8      Cord    Vessels: 3 Vessels  Complications: None  Delayed cord clamping?: Yes  Cord clamped date/time: 2022 0652  Cord blood disposition: Lab  Gases sent?: No    [place] Measurements    Weight: 3520 g Length: 53.3 cm   Head circumference: 37 cm Chest circumference: 34.5 cm   Abdominal girth: 35 cm      Placenta    Placenta delivery date/time: 2022 0307  Placenta removal: Spontaneous  Placenta appearance: Intact  Placenta disposition: Placenta Refrigerator    Lacerations    Episiotomy: None  Perineal laceration: None  Other lacerations?: No

## 2023-02-27 ENCOUNTER — OFFICE VISIT (OUTPATIENT)
Dept: SURGERY | Age: 33
End: 2023-02-27
Payer: COMMERCIAL

## 2023-02-27 VITALS
SYSTOLIC BLOOD PRESSURE: 110 MMHG | HEART RATE: 82 BPM | DIASTOLIC BLOOD PRESSURE: 68 MMHG | WEIGHT: 165.7 LBS | OXYGEN SATURATION: 99 % | HEIGHT: 66 IN | BODY MASS INDEX: 26.63 KG/M2

## 2023-02-27 DIAGNOSIS — R22.32 ARM MASS, LEFT: Primary | ICD-10-CM

## 2023-02-27 PROCEDURE — 99214 OFFICE O/P EST MOD 30 MIN: CPT | Performed by: NURSE PRACTITIONER

## 2023-02-27 ASSESSMENT — PATIENT HEALTH QUESTIONNAIRE - PHQ9
1. LITTLE INTEREST OR PLEASURE IN DOING THINGS: 0
SUM OF ALL RESPONSES TO PHQ QUESTIONS 1-9: 0
2. FEELING DOWN, DEPRESSED OR HOPELESS: 0
SUM OF ALL RESPONSES TO PHQ9 QUESTIONS 1 & 2: 0
SUM OF ALL RESPONSES TO PHQ QUESTIONS 1-9: 0

## 2023-02-27 NOTE — PROGRESS NOTES
General Surgery Progress Note  MD Wilman Webb, ZOE    HISTORY OF PRESENT ILLNESS:    Cassandra Mcleod is a 34 y.o. female presents for follow up of a abscess located over the left arm. Patient states she had a tDap several years ago and developed a knot that has not gone away. She states over the last several months it has grown in size and become increasingly more painful. Erythema/rash increased in size. Had [address] which revealed:  Likely enlarged lymph node however a complex cyst or small abscess are possible. Pathology revealed:   Histologic sections demonstrate a small biopsy of a  proliferation of bland spindle cells admixed with a chronic  inflammatory infiltrate with mixed histiocytes and  hemosiderin depositions    Patient states it has increased in size and would like to have it removed. Last US was in 2019. Past Medical History:   Diagnosis Date    Anxiety     Arthritis     back, elbow & wrists    Asthma     Last flare up: 3/15/2020    Back pain     compressed discs, L4 and L5, herniated disc    Depression     Migraines     Last migraine: 5/14/20     Past Surgical History:   Procedure Laterality Date    WISDOM TOOTH EXTRACTION  08/2010       REVIEW OF SYSTEMS:    Review of Systems    PHYSICAL EXAM:    VITALS:  /68 (Site: Left Upper Arm, Position: Sitting, Cuff Size: Large Adult)   Pulse 82   Ht 5' 6\" (1.676 m)   Wt 165 lb 11.2 oz (75.2 kg)   SpO2 99%   BMI 26.74 kg/m²   Physical Exam  Constitutional:       Appearance: She is well-developed. Eyes:      General: No scleral icterus. Conjunctiva/sclera: Conjunctivae normal.   Cardiovascular:      Rate and Rhythm: Normal rate and regular rhythm. Heart sounds: Normal heart sounds. No murmur heard. Pulmonary:      Effort: Pulmonary effort is normal. No respiratory distress. Breath sounds: Normal breath sounds. No wheezing. Skin:         Neurological:      Mental Status: She is alert.        ASSESSMENT/ PLAN:  Lisy De Oliveira is a 34 y.o. female with mass of left upper arm    Discussed options and plan with Lisy De Oliveira. Patient presents with a mass of left upper arm after getting a tDAP shot. States mass has grown over the last couple years and did not follow up due to [place]. She presents today wanting the mass removed. Last US was in 2019, will get US and have her return to discuss options with Dr. Ravinder Lowery. Thank you for the consultation and the opportunity to care for Lisy De Oliveira    Return for Follow up after testing.     Trell Otero, APRN - CNP, CNP